# Patient Record
Sex: MALE | Race: WHITE | NOT HISPANIC OR LATINO | Employment: FULL TIME | URBAN - METROPOLITAN AREA
[De-identification: names, ages, dates, MRNs, and addresses within clinical notes are randomized per-mention and may not be internally consistent; named-entity substitution may affect disease eponyms.]

---

## 2023-05-17 ENCOUNTER — OFFICE VISIT (OUTPATIENT)
Dept: FAMILY MEDICINE | Facility: CLINIC | Age: 58
End: 2023-05-17
Payer: COMMERCIAL

## 2023-05-17 VITALS
WEIGHT: 238.5 LBS | HEART RATE: 98 BPM | OXYGEN SATURATION: 94 % | DIASTOLIC BLOOD PRESSURE: 72 MMHG | TEMPERATURE: 98.2 F | BODY MASS INDEX: 37.43 KG/M2 | HEIGHT: 67 IN | RESPIRATION RATE: 20 BRPM | SYSTOLIC BLOOD PRESSURE: 132 MMHG

## 2023-05-17 DIAGNOSIS — Z11.59 NEED FOR HEPATITIS C SCREENING TEST: ICD-10-CM

## 2023-05-17 DIAGNOSIS — Z11.4 SCREENING FOR HIV (HUMAN IMMUNODEFICIENCY VIRUS): ICD-10-CM

## 2023-05-17 DIAGNOSIS — E03.1 CONGENITAL HYPOTHYROIDISM WITHOUT GOITER: ICD-10-CM

## 2023-05-17 DIAGNOSIS — Z12.11 SCREEN FOR COLON CANCER: ICD-10-CM

## 2023-05-17 DIAGNOSIS — E11.43 TYPE 2 DIABETES MELLITUS WITH DIABETIC AUTONOMIC NEUROPATHY, WITHOUT LONG-TERM CURRENT USE OF INSULIN (H): ICD-10-CM

## 2023-05-17 DIAGNOSIS — E66.01 MORBID OBESITY (H): Primary | ICD-10-CM

## 2023-05-17 DIAGNOSIS — F51.02 ADJUSTMENT INSOMNIA: ICD-10-CM

## 2023-05-17 PROBLEM — E78.5 HYPERLIPIDEMIA: Status: ACTIVE | Noted: 2020-02-10

## 2023-05-17 PROBLEM — I47.10 SVT (SUPRAVENTRICULAR TACHYCARDIA) (H): Status: ACTIVE | Noted: 2020-02-10

## 2023-05-17 PROBLEM — E03.9 HYPOTHYROIDISM: Status: ACTIVE | Noted: 2018-04-26

## 2023-05-17 PROBLEM — R23.4 FISSURE IN SKIN OF FOOT: Status: ACTIVE | Noted: 2019-01-16

## 2023-05-17 PROBLEM — G43.909 MIGRAINES: Status: ACTIVE | Noted: 2023-05-17

## 2023-05-17 LAB — HBA1C MFR BLD: 7.9 % (ref 0–5.6)

## 2023-05-17 PROCEDURE — 83036 HEMOGLOBIN GLYCOSYLATED A1C: CPT | Performed by: PHYSICIAN ASSISTANT

## 2023-05-17 PROCEDURE — 84443 ASSAY THYROID STIM HORMONE: CPT | Performed by: PHYSICIAN ASSISTANT

## 2023-05-17 PROCEDURE — 99204 OFFICE O/P NEW MOD 45 MIN: CPT | Performed by: PHYSICIAN ASSISTANT

## 2023-05-17 PROCEDURE — 80061 LIPID PANEL: CPT | Performed by: PHYSICIAN ASSISTANT

## 2023-05-17 PROCEDURE — 36415 COLL VENOUS BLD VENIPUNCTURE: CPT | Performed by: PHYSICIAN ASSISTANT

## 2023-05-17 PROCEDURE — 99207 PR FOOT EXAM NO CHARGE: CPT | Performed by: PHYSICIAN ASSISTANT

## 2023-05-17 RX ORDER — LANCETS 33 GAUGE
EACH MISCELLANEOUS
COMMUNITY
Start: 2023-02-01

## 2023-05-17 RX ORDER — METOPROLOL TARTRATE 50 MG
50 TABLET ORAL DAILY
COMMUNITY
Start: 2023-01-20 | End: 2024-05-29

## 2023-05-17 RX ORDER — TRAZODONE HYDROCHLORIDE 50 MG/1
50 TABLET, FILM COATED ORAL AT BEDTIME
Qty: 30 TABLET | Refills: 0 | Status: SHIPPED | OUTPATIENT
Start: 2023-05-17 | End: 2023-06-12

## 2023-05-17 RX ORDER — LEVOTHYROXINE SODIUM 200 UG/1
1 TABLET ORAL DAILY
COMMUNITY
Start: 2023-01-23 | End: 2024-05-08

## 2023-05-17 RX ORDER — SUMATRIPTAN 50 MG/1
TABLET, FILM COATED ORAL
COMMUNITY
Start: 2023-01-20 | End: 2024-05-08

## 2023-05-17 RX ORDER — BLOOD SUGAR DIAGNOSTIC
1 STRIP MISCELLANEOUS 3 TIMES DAILY
COMMUNITY
Start: 2023-04-30

## 2023-05-17 RX ORDER — METFORMIN HCL 500 MG
2000 TABLET, EXTENDED RELEASE 24 HR ORAL
COMMUNITY
Start: 2023-01-20 | End: 2024-07-09

## 2023-05-17 RX ORDER — GLIPIZIDE 10 MG/1
10 TABLET, FILM COATED, EXTENDED RELEASE ORAL 2 TIMES DAILY
COMMUNITY
Start: 2023-01-20

## 2023-05-17 RX ORDER — ATORVASTATIN CALCIUM 10 MG/1
10 TABLET, FILM COATED ORAL DAILY
COMMUNITY
Start: 2023-01-20 | End: 2024-05-08

## 2023-05-17 ASSESSMENT — ENCOUNTER SYMPTOMS
NERVOUS/ANXIOUS: 0
SLEEP DISTURBANCE: 1
ACTIVITY CHANGE: 1
GASTROINTESTINAL NEGATIVE: 1
CARDIOVASCULAR NEGATIVE: 1
RESPIRATORY NEGATIVE: 1
NUMBNESS: 1

## 2023-05-17 NOTE — PROGRESS NOTES
"  Assessment & Plan     Congenital hypothyroidism without goiter  Labs today  - TSH WITH FREE T4 REFLEX  - TSH WITH FREE T4 REFLEX    Screen for colon cancer  Good for another 70    Screening for HIV (human immunodeficiency virus)  Has had screening in the past defers today    Need for hepatitis C screening test  Defers today  - Hemoglobin A1c  - Hemoglobin A1c    Type 2 diabetes mellitus with diabetic autonomic neuropathy, without long-term current use of insulin (H)  Labs pending  - Lipid panel reflex to direct LDL Non-fasting  - empagliflozin (JARDIANCE) 10 MG TABS tablet  Dispense: 90 tablet; Refill: 1  - Hemoglobin A1c  - FOOT EXAM  - Lipid panel reflex to direct LDL Non-fasting  - Hemoglobin A1c    Morbid obesity (H)  Is less active at work will try to be more active outside of work  - Hemoglobin A1c  - Hemoglobin A1c    Adjustment insomnia  Trial of trazodone for sleep side effects discussed  - traZODone (DESYREL) 50 MG tablet  Dispense: 30 tablet; Refill: 0               BMI:   Estimated body mass index is 37.35 kg/m  as calculated from the following:    Height as of this encounter: 1.702 m (5' 7\").    Weight as of this encounter: 108.2 kg (238 lb 8 oz).           KERLINE Land  [facility] [place]    Yan Clarke is a 57 year old, presenting for the following health issues:  Establish Care    57-year-old here to establish care he has history of hypo-thyroidism hypertension type 2 diabetes  He has some difficulty with insomnia as of late more difficulty maintaining sleep rare difficulty initiating sleep  No new social change although is been going through the divorce process for the last couple of years  He was prescribed Jardiance but had some difficulty obtaining that prescription due to cost locally he has found out that he can get it through [facility] very economically and would like a prescription sent for that he is due for some screening lab work  Colonoscopy 3 " "years ago which was normal he was scheduled for follow-up in 10 years    History of Present Illness       Reason for visit:  Changing clinic after a move and updating meds    He eats 0-1 servings of fruits and vegetables daily.He consumes 0 sweetened beverage(s) daily.He exercises with enough effort to increase his heart rate 9 or less minutes per day.  He exercises with enough effort to increase his heart rate 5 days per week. He is missing 1 dose(s) of medications per week.  He is not taking prescribed medications regularly due to remembering to take.               Review of Systems   Constitutional: Positive for activity change.   Respiratory: Negative.    Cardiovascular: Negative.    Gastrointestinal: Negative.    Neurological: Positive for numbness.   Psychiatric/Behavioral: Positive for sleep disturbance. The patient is not nervous/anxious.             Objective    /72   Pulse 98   Temp 98.2  F (36.8  C)   Resp 20   Ht 1.702 m (5' 7\")   Wt 108.2 kg (238 lb 8 oz)   SpO2 94%   BMI 37.35 kg/m    Body mass index is 37.35 kg/m .  Physical Exam  Vitals and nursing note reviewed.   Constitutional:       Appearance: Normal appearance.   HENT:      Head: Normocephalic and atraumatic.      Right Ear: Tympanic membrane normal.      Left Ear: Tympanic membrane normal.      Nose: Nose normal. No congestion or rhinorrhea.      Mouth/Throat:      Mouth: Mucous membranes are moist.   Eyes:      Conjunctiva/sclera: Conjunctivae normal.   Cardiovascular:      Rate and Rhythm: Normal rate and regular rhythm.      Heart sounds: Normal heart sounds.   Pulmonary:      Effort: Pulmonary effort is normal.      Breath sounds: Normal breath sounds.   Abdominal:      General: Abdomen is flat. Bowel sounds are normal.      Palpations: There is no mass.      Tenderness: There is no abdominal tenderness.   Musculoskeletal:         General: Normal range of motion.      Cervical back: Normal range of motion and neck supple.      " Right lower leg: No edema.      Left lower leg: No edema.   Lymphadenopathy:      Cervical: No cervical adenopathy.   Skin:     General: Skin is warm and dry.      Comments: Onychomycotic changes right great toenail   Neurological:      General: No focal deficit present.      Comments: Monofilament 5/5 to the feet bilaterally   Psychiatric:         Mood and Affect: Mood normal.         Behavior: Behavior normal.         Thought Content: Thought content normal.         Judgment: Judgment normal.

## 2023-05-17 NOTE — LETTER
May 19, 2023      Vince Martinez  [address] [address]        Dear ,    We are writing to inform you of your test results.    Your diabetes is under acceptable control but we should be able to improve that with the addition of Jardiance.  Your TSH is normal.  Your lipid panel looks acceptable except for the elevated triglycerides.  Because this was a nonfasting level I am not overly concerned about that.  Next year we should do those fasting.    Resulted Orders   TSH WITH FREE T4 REFLEX   Result Value Ref Range    TSH 0.46 0.30 - 4.20 uIU/mL   Lipid panel reflex to direct LDL Non-fasting   Result Value Ref Range    Cholesterol 161 <200 mg/dL    Triglycerides 322 (H) <150 mg/dL    Direct Measure HDL 50 >=40 mg/dL    LDL Cholesterol Calculated 47 <=100 mg/dL    Non HDL Cholesterol 111 <130 mg/dL    Narrative    Cholesterol  Desirable:  <200 mg/dL    Triglycerides  Normal:  Less than 150 mg/dL  Borderline High:  150-199 mg/dL  High:  200-499 mg/dL  Very High:  Greater than or equal to 500 mg/dL    Direct Measure HDL  Female:  Greater than or equal to 50 mg/dL   Male:  Greater than or equal to 40 mg/dL    LDL Cholesterol  Desirable:  <100mg/dL  Above Desirable:  100-129 mg/dL   Borderline High:  130-159 mg/dL   High:  160-189 mg/dL   Very High:  >= 190 mg/dL    Non HDL Cholesterol  Desirable:  130 mg/dL  Above Desirable:  130-159 mg/dL  Borderline High:  160-189 mg/dL  High:  190-219 mg/dL  Very High:  Greater than or equal to 220 mg/dL   Hemoglobin A1c   Result Value Ref Range    Hemoglobin A1C 7.9 (H) 0.0 - 5.6 %      Comment:      Normal <5.7%   Prediabetes 5.7-6.4%    Diabetes 6.5% or higher     Note: Adopted from ADA consensus guidelines.       If you have any questions or concerns, please call the clinic at the number listed above.       Sincerely,      KERLINE Falcon

## 2023-05-18 LAB
CHOLEST SERPL-MCNC: 161 MG/DL
HDLC SERPL-MCNC: 50 MG/DL
LDLC SERPL CALC-MCNC: 47 MG/DL
NONHDLC SERPL-MCNC: 111 MG/DL
TRIGL SERPL-MCNC: 322 MG/DL
TSH SERPL DL<=0.005 MIU/L-ACNC: 0.46 UIU/ML (ref 0.3–4.2)

## 2023-06-09 DIAGNOSIS — F51.02 ADJUSTMENT INSOMNIA: ICD-10-CM

## 2023-06-12 RX ORDER — TRAZODONE HYDROCHLORIDE 50 MG/1
TABLET, FILM COATED ORAL
Qty: 30 TABLET | Refills: 0 | Status: SHIPPED | OUTPATIENT
Start: 2023-06-12 | End: 2023-07-10

## 2023-06-12 NOTE — TELEPHONE ENCOUNTER
"Last Written Prescription Date: 5/17/23  Last Fill Quantity: 30,  # refills: 0  Last office visit provider:  Jayant Dalton 5/17/23  Adjustment insomnia  Trial of trazodone for sleep side effects discussed  - traZODone (DESYREL) 50 MG tablet  Dispense: 30 tablet; Refill: 0    Will only approve for 30 days.  If patient wants more refills, stated in Pharmacy note, that patient will need an office visit.    Writer attempted to call patient with no response, inability to leave VM.     Requested Prescriptions   Pending Prescriptions Disp Refills     traZODone (DESYREL) 50 MG tablet [Pharmacy Med Name: TRAZODONE 50 MG TABLET] 30 tablet 0     Sig: TAKE 1 TABLET BY MOUTH AT BEDTIME       Serotonin Modulators Passed - 6/9/2023 11:36 AM        Passed - Recent (12 mo) or future (30 days) visit within the authorizing provider's specialty     Patient has had an office visit with the authorizing provider or a provider within the authorizing providers department within the previous 12 mos or has a future within next 30 days. See \"Patient Info\" tab in inbasket, or \"Choose Columns\" in Meds & Orders section of the refill encounter.              Passed - Medication is active on med list        Passed - Patient is age 18 or older             Rafael Haider RN 06/12/23 11:35 AM  "

## 2023-07-09 DIAGNOSIS — F51.02 ADJUSTMENT INSOMNIA: ICD-10-CM

## 2023-07-09 NOTE — TELEPHONE ENCOUNTER
"Routing refill request to provider for review/approval because:  Per sig: If patient wants to continue this medication he needs an office visit with provider.    Last Written Prescription Date:  6/12/23  Last Fill Quantity: 30 ,  # refills: 0  Last office visit provider:  5/17/23 with rere    Requested Prescriptions   Pending Prescriptions Disp Refills     traZODone (DESYREL) 50 MG tablet [Pharmacy Med Name: TRAZODONE 50 MG TABLET] 30 tablet 0     Sig: TAKE 1 TABLET BY MOUTH EVERYDAY AT BEDTIME       Serotonin Modulators Passed - 7/9/2023  4:34 PM        Passed - Recent (12 mo) or future (30 days) visit within the authorizing provider's specialty     Patient has had an office visit with the authorizing provider or a provider within the authorizing providers department within the previous 12 mos or has a future within next 30 days. See \"Patient Info\" tab in inbasket, or \"Choose Columns\" in Meds & Orders section of the refill encounter.              Passed - Medication is active on med list        Passed - Patient is age 18 or older             PRIYA LEAVITT RN 07/09/23 4:37 PM  "

## 2023-07-10 RX ORDER — TRAZODONE HYDROCHLORIDE 50 MG/1
TABLET, FILM COATED ORAL
Qty: 30 TABLET | Refills: 0 | Status: SHIPPED | OUTPATIENT
Start: 2023-07-10 | End: 2023-08-07

## 2023-08-05 DIAGNOSIS — F51.02 ADJUSTMENT INSOMNIA: ICD-10-CM

## 2023-08-06 NOTE — TELEPHONE ENCOUNTER
"  Last Written Prescription Date:  7/10/23  Last Fill Quantity: 30,  # refills: 0   Last office visit provider:   5/17/23    Requested Prescriptions   Pending Prescriptions Disp Refills    traZODone (DESYREL) 50 MG tablet [Pharmacy Med Name: TRAZODONE 50 MG TABLET] 30 tablet 0     Sig: TAKE 1 TABLET BY MOUTH EVERYDAY AT BEDTIME       Serotonin Modulators Passed - 8/5/2023 12:31 PM        Passed - Recent (12 mo) or future (30 days) visit within the authorizing provider's specialty     Patient has had an office visit with the authorizing provider or a provider within the authorizing providers department within the previous 12 mos or has a future within next 30 days. See \"Patient Info\" tab in inbasket, or \"Choose Columns\" in Meds & Orders section of the refill encounter.              Passed - Medication is active on med list        Passed - Patient is age 18 or older             Krissy Kitchen RN 08/06/23 3:01 PM  "

## 2023-08-07 RX ORDER — TRAZODONE HYDROCHLORIDE 50 MG/1
TABLET, FILM COATED ORAL
Qty: 30 TABLET | Refills: 0 | Status: SHIPPED | OUTPATIENT
Start: 2023-08-07 | End: 2023-09-02

## 2023-09-02 DIAGNOSIS — F51.02 ADJUSTMENT INSOMNIA: ICD-10-CM

## 2023-09-02 RX ORDER — TRAZODONE HYDROCHLORIDE 50 MG/1
TABLET, FILM COATED ORAL
Qty: 90 TABLET | Refills: 2 | Status: SHIPPED | OUTPATIENT
Start: 2023-09-02 | End: 2024-07-08

## 2023-09-02 NOTE — TELEPHONE ENCOUNTER
"  Last Written Prescription Date:  8/7/23  Last Fill Quantity: 30,  # refills: 0   Last office visit provider:   5/17/23    Requested Prescriptions   Pending Prescriptions Disp Refills    traZODone (DESYREL) 50 MG tablet [Pharmacy Med Name: TRAZODONE 50 MG TABLET] 30 tablet 0     Sig: TAKE 1 TABLET BY MOUTH EVERYDAY AT BEDTIME       Serotonin Modulators Passed - 9/2/2023  9:35 AM        Passed - Recent (12 mo) or future (30 days) visit within the authorizing provider's specialty     Patient has had an office visit with the authorizing provider or a provider within the authorizing providers department within the previous 12 mos or has a future within next 30 days. See \"Patient Info\" tab in inbasket, or \"Choose Columns\" in Meds & Orders section of the refill encounter.              Passed - Medication is active on med list        Passed - Patient is age 18 or older             Krissy Kitchen RN 09/02/23 5:58 PM  "

## 2024-05-08 ENCOUNTER — OFFICE VISIT (OUTPATIENT)
Dept: FAMILY MEDICINE | Facility: CLINIC | Age: 59
End: 2024-05-08
Payer: COMMERCIAL

## 2024-05-08 VITALS
OXYGEN SATURATION: 97 % | RESPIRATION RATE: 18 BRPM | TEMPERATURE: 97.1 F | BODY MASS INDEX: 36.37 KG/M2 | WEIGHT: 231.7 LBS | SYSTOLIC BLOOD PRESSURE: 128 MMHG | HEART RATE: 64 BPM | HEIGHT: 67 IN | DIASTOLIC BLOOD PRESSURE: 86 MMHG

## 2024-05-08 DIAGNOSIS — Z11.4 SCREENING FOR HIV (HUMAN IMMUNODEFICIENCY VIRUS): ICD-10-CM

## 2024-05-08 DIAGNOSIS — Z00.00 ANNUAL PHYSICAL EXAM: ICD-10-CM

## 2024-05-08 DIAGNOSIS — G43.009 MIGRAINE WITHOUT AURA AND WITHOUT STATUS MIGRAINOSUS, NOT INTRACTABLE: ICD-10-CM

## 2024-05-08 DIAGNOSIS — E11.43 TYPE 2 DIABETES MELLITUS WITH DIABETIC AUTONOMIC NEUROPATHY, WITHOUT LONG-TERM CURRENT USE OF INSULIN (H): Primary | ICD-10-CM

## 2024-05-08 DIAGNOSIS — Z11.59 NEED FOR HEPATITIS C SCREENING TEST: ICD-10-CM

## 2024-05-08 DIAGNOSIS — E66.01 MORBID OBESITY (H): ICD-10-CM

## 2024-05-08 DIAGNOSIS — E03.1 CONGENITAL HYPOTHYROIDISM WITHOUT GOITER: ICD-10-CM

## 2024-05-08 DIAGNOSIS — E78.2 MIXED HYPERLIPIDEMIA: ICD-10-CM

## 2024-05-08 DIAGNOSIS — F51.02 ADJUSTMENT INSOMNIA: ICD-10-CM

## 2024-05-08 PROBLEM — I47.10 SVT (SUPRAVENTRICULAR TACHYCARDIA) (H): Status: RESOLVED | Noted: 2020-02-10 | Resolved: 2024-05-08

## 2024-05-08 LAB — HBA1C MFR BLD: 8 % (ref 0–5.6)

## 2024-05-08 PROCEDURE — 82043 UR ALBUMIN QUANTITATIVE: CPT | Performed by: PHYSICIAN ASSISTANT

## 2024-05-08 PROCEDURE — 99214 OFFICE O/P EST MOD 30 MIN: CPT | Mod: 25 | Performed by: PHYSICIAN ASSISTANT

## 2024-05-08 PROCEDURE — 87389 HIV-1 AG W/HIV-1&-2 AB AG IA: CPT | Performed by: PHYSICIAN ASSISTANT

## 2024-05-08 PROCEDURE — 36415 COLL VENOUS BLD VENIPUNCTURE: CPT | Performed by: PHYSICIAN ASSISTANT

## 2024-05-08 PROCEDURE — 90715 TDAP VACCINE 7 YRS/> IM: CPT | Performed by: PHYSICIAN ASSISTANT

## 2024-05-08 PROCEDURE — 83036 HEMOGLOBIN GLYCOSYLATED A1C: CPT | Performed by: PHYSICIAN ASSISTANT

## 2024-05-08 PROCEDURE — 82570 ASSAY OF URINE CREATININE: CPT | Performed by: PHYSICIAN ASSISTANT

## 2024-05-08 PROCEDURE — 83721 ASSAY OF BLOOD LIPOPROTEIN: CPT | Mod: 59 | Performed by: PHYSICIAN ASSISTANT

## 2024-05-08 PROCEDURE — 99396 PREV VISIT EST AGE 40-64: CPT | Mod: 25 | Performed by: PHYSICIAN ASSISTANT

## 2024-05-08 PROCEDURE — 84443 ASSAY THYROID STIM HORMONE: CPT | Performed by: PHYSICIAN ASSISTANT

## 2024-05-08 PROCEDURE — 84439 ASSAY OF FREE THYROXINE: CPT | Performed by: PHYSICIAN ASSISTANT

## 2024-05-08 PROCEDURE — 90471 IMMUNIZATION ADMIN: CPT | Performed by: PHYSICIAN ASSISTANT

## 2024-05-08 PROCEDURE — 80048 BASIC METABOLIC PNL TOTAL CA: CPT | Performed by: PHYSICIAN ASSISTANT

## 2024-05-08 PROCEDURE — 86803 HEPATITIS C AB TEST: CPT | Performed by: PHYSICIAN ASSISTANT

## 2024-05-08 PROCEDURE — 80061 LIPID PANEL: CPT | Performed by: PHYSICIAN ASSISTANT

## 2024-05-08 RX ORDER — SUMATRIPTAN 50 MG/1
50 TABLET, FILM COATED ORAL
Qty: 9 TABLET | Refills: 11 | Status: SHIPPED | OUTPATIENT
Start: 2024-05-08

## 2024-05-08 RX ORDER — ATORVASTATIN CALCIUM 10 MG/1
10 TABLET, FILM COATED ORAL DAILY
Qty: 90 TABLET | Refills: 3 | Status: SHIPPED | OUTPATIENT
Start: 2024-05-08

## 2024-05-08 RX ORDER — LEVOTHYROXINE SODIUM 200 UG/1
200 TABLET ORAL DAILY
Qty: 90 TABLET | Refills: 3 | Status: SHIPPED | OUTPATIENT
Start: 2024-05-08

## 2024-05-08 SDOH — HEALTH STABILITY: PHYSICAL HEALTH: ON AVERAGE, HOW MANY DAYS PER WEEK DO YOU ENGAGE IN MODERATE TO STRENUOUS EXERCISE (LIKE A BRISK WALK)?: 0 DAYS

## 2024-05-08 ASSESSMENT — SOCIAL DETERMINANTS OF HEALTH (SDOH): HOW OFTEN DO YOU GET TOGETHER WITH FRIENDS OR RELATIVES?: ONCE A WEEK

## 2024-05-08 NOTE — LETTER
May 10, 2024      Vince Martinez  [address] [address]        Dear ,    We are writing to inform you of your test results.    Here are your lab results.  Your lipids are more elevated than I would like, and I would consider either increasing your current dose of statin, or switching to a more potent statin.  Your hemoglobin A1c is more elevated than I would like.  I would like you to consider increasing the Jardiance to 25 mg once daily.  Your TSH and free T4 are abnormal indicating that you have been off your medication for a while.  We should recheck those in 3 months after you are back on the medication to ensure it is an adequate dose.  Please call me next week to discuss these issues.    Resulted Orders   BASIC METABOLIC PANEL   Result Value Ref Range    Sodium 138 135 - 145 mmol/L      Comment:      Reference intervals for this test were updated on 09/26/2023 to more accurately reflect our healthy population. There may be differences in the flagging of prior results with similar values performed with this method. Interpretation of those prior results can be made in the context of the updated reference intervals.     Potassium 4.8 3.4 - 5.3 mmol/L    Chloride 102 98 - 107 mmol/L    Carbon Dioxide (CO2) 26 22 - 29 mmol/L    Anion Gap 10 7 - 15 mmol/L    Urea Nitrogen 18.3 8.0 - 23.0 mg/dL    Creatinine 1.16 0.67 - 1.17 mg/dL    GFR Estimate 73 >60 mL/min/1.73m2    Calcium 9.5 8.6 - 10.0 mg/dL    Glucose 125 (H) 70 - 99 mg/dL    Patient Fasting > 8hrs? Unknown    HIV Antigen Antibody Combo   Result Value Ref Range    HIV Antigen Antibody Combo Nonreactive Nonreactive      Comment:      Negative HIV-1 p24 antigen and HIV-1/2 antibody screening test results usually indicate the absence of HIV-1 and HIV-2 infection. However, such negative results do not rule-out acute HIV infection.  If acute HIV-1 or HIV-2 infection is suspected, detection of HIV-1 or HIV-2 RNA  is  recommended.    Hepatitis C Screen Reflex to HCV RNA Quant and Genotype   Result Value Ref Range    Hepatitis C Antibody Nonreactive Nonreactive      Comment:      A nonreactive screening test result does not exclude the possibility of exposure to or infection with HCV. Nonreactive screening test results in individuals with prior exposure to HCV may be due to antibody levels below the limit of detection of this assay or lack of reactivity to the HCV antigens used in this assay. Patients with recent HCV infections (<3 months from time of exposure) may have false-negative HCV antibody results due to the time needed for seroconversion (average of 8 to 9 weeks).   HEMOGLOBIN A1C   Result Value Ref Range    Hemoglobin A1C 8.0 (H) 0.0 - 5.6 %      Comment:      Normal <5.7%   Prediabetes 5.7-6.4%    Diabetes 6.5% or higher     Note: Adopted from ADA consensus guidelines.   Lipid panel reflex to direct LDL Non-fasting   Result Value Ref Range    Cholesterol 312 (H) <200 mg/dL    Triglycerides 478 (H) <150 mg/dL    Direct Measure HDL 55 >=40 mg/dL    LDL Cholesterol Calculated        Comment:      Cannot estimate LDL when triglyceride exceeds 400 mg/dL    Non HDL Cholesterol 257 (H) <130 mg/dL    Patient Fasting > 8hrs? Unknown     Narrative    Cholesterol  Desirable:  <200 mg/dL    Triglycerides  Normal:  Less than 150 mg/dL  Borderline High:  150-199 mg/dL  High:  200-499 mg/dL  Very High:  Greater than or equal to 500 mg/dL    Direct Measure HDL  Female:  Greater than or equal to 50 mg/dL   Male:  Greater than or equal to 40 mg/dL    LDL Cholesterol  Desirable:  <100mg/dL  Above Desirable:  100-129 mg/dL   Borderline High:  130-159 mg/dL   High:  160-189 mg/dL   Very High:  >= 190 mg/dL    Non HDL Cholesterol  Desirable:  130 mg/dL  Above Desirable:  130-159 mg/dL  Borderline High:  160-189 mg/dL  High:  190-219 mg/dL  Very High:  Greater than or equal to 220 mg/dL   TSH WITH FREE T4 REFLEX   Result Value Ref Range     TSH 57.00 (H) 0.30 - 4.20 uIU/mL   Albumin Random Urine Quantitative with Creat Ratio   Result Value Ref Range    Creatinine Urine mg/dL 80.4 mg/dL      Comment:      The reference ranges have not been established in urine creatinine. The results should be integrated into the clinical context for interpretation.    Albumin Urine mg/L <12.0 mg/L      Comment:      The reference ranges have not been established in urine albumin. The results should be integrated into the clinical context for interpretation.    Albumin Urine mg/g Cr        Comment:      Unable to calculate, urine albumin and/or urine creatinine is outside detectable limits.  Microalbuminuria is defined as an albumin:creatinine ratio of 17 to 299 for males and 25 to 299 for females. A ratio of albumin:creatinine of 300 or higher is indicative of overt proteinuria.  Due to biologic variability, positive results should be confirmed by a second, first-morning random or 24-hour timed urine specimen. If there is discrepancy, a third specimen is recommended. When 2 out of 3 results are in the microalbuminuria range, this is evidence for incipient nephropathy and warrants increased efforts at glucose control, blood pressure control, and institution of therapy with an angiotensin-converting-enzyme (ACE) inhibitor (if the patient can tolerate it).     T4 free   Result Value Ref Range    Free T4 0.55 (L) 0.90 - 1.70 ng/dL   LDL cholesterol direct   Result Value Ref Range    LDL Cholesterol Direct 194 (H) <100 mg/dL      Comment:      Age 2-19 years:  Desirable: 0-110 mg/dL   Borderline high: 110-129 mg/dL   High: >= 130 mg/dL    Age 20 years and older:  Desirable: <100mg/dL  Above desirable: 100-129 mg/dL   Borderline high: 130-159 mg/dL   High: 160-189 mg/dL   Very high: >= 190 mg/dL       If you have any questions or concerns, please call the clinic at the number listed above.       Sincerely,      KERLINE Falcon

## 2024-05-08 NOTE — PROGRESS NOTES
Preventive Care Visit  [facility]  KERLINE Land, Family Medicine  May 8, 2024      Assessment & Plan     (E11.43) Type 2 diabetes mellitus with diabetic autonomic neuropathy, without long-term current use of insulin (H)  (primary encounter diagnosis)  Comment: Stable  Plan: BASIC METABOLIC PANEL, HEMOGLOBIN A1C, Lipid         panel reflex to direct LDL Non-fasting, Albumin        Random Urine Quantitative with Creat Ratio        Labs pending    (Z11.4) Screening for HIV (human immunodeficiency virus)  Comment: Consents to screening  Plan: HIV Antigen Antibody Combo, TSH WITH FREE T4         REFLEX        Lab today    (Z11.59) Need for hepatitis C screening test  Comment: Consents to screening  Plan: Hepatitis C Screen Reflex to HCV RNA Quant and         Genotype, TSH WITH FREE T4 REFLEX, Albumin         Random Urine Quantitative with Creat Ratio        Labs today    (F51.02) Adjustment insomnia  Comment: Will get back on his meds and see if it makes a difference if not he will follow-up  Plan:     (E03.1) Congenital hypothyroidism without goiter  Comment: Labs pending  Plan: TSH WITH FREE T4 REFLEX, levothyroxine         (SYNTHROID/LEVOTHROID) 200 MCG tablet        Continue current medication    (G43.009) Migraine without aura and without status migrainosus, not intractable  Comment: Stable infrequent  Plan: TSH WITH FREE T4 REFLEX, SUMAtriptan (IMITREX)         50 MG tablet        Labs pending    (Z00.00) Annual physical exam  Comment: Stable  Plan: TSH WITH FREE T4 REFLEX        Return in 1 year and as needed    (E78.2) Mixed hyperlipidemia  Comment: Stable  Plan: atorvastatin (LIPITOR) 10 MG tablet        Labs pending continue current medication    (E66.01) Morbid obesity (H)  Comment: Work on lifestyle modification  Plan: Work on lifestyle modification              BMI  Estimated body mass index is 36.29 kg/m  as calculated from the following:    Height as of this encounter:  "1.702 m (5' 7\").    Weight as of this encounter: 105.1 kg (231 lb 11.2 oz).       Counseling  Appropriate preventive services were discussed with this patient, including applicable screening as appropriate for fall prevention, nutrition, physical activity, Tobacco-use cessation, weight loss and cognition.  Checklist reviewing preventive services available has been given to the patient.  Reviewed patient's diet, addressing concerns and/or questions.   The patient was instructed to see the dentist every 6 months.   He is at risk for psychosocial distress and has been provided with information to reduce risk.           Yan Clarke is a 58 year old, presenting for the following:  Physical (Refill medications )        5/8/2024     2:59 PM   Additional Questions   Roomed by BRYAN Low        Health Care Directive  Patient does not have a Health Care Directive or Living Will:     58-year-old here for preventive visit  Chronic disease visit as well  Tolerates his medications without difficulty  Rarely gets SVT he does some Valsalva maneuvers to get it to resolve  Over the last couple weeks has had some difficulty sleeping problems initiating sleep problems staying asleep he will wake up in 2 to 4 hours and does not feel tired difficult time getting back to sleep  He has been using some CBN ?CBD oil for the last couple of months wanted my opinion told him I did not have enough experience to give a educated opinion  No chest pain or shortness of breath  In his left foot he occasionally will experience a sensation of numbness and then some burning cramping type pain he mainly notices it at work                  5/8/2024   General Health   How would you rate your overall physical health? (!) FAIR   Feel stress (tense, anxious, or unable to sleep) Only a little   (!) STRESS CONCERN      5/8/2024   Nutrition   Three or more servings of calcium each day? (!) NO   Diet: Diabetic   How many servings of fruit and vegetables " "per day? (!) 0-1   How many sweetened beverages each day? 0-1         5/8/2024   Exercise   Days per week of moderate/strenous exercise 0 days   (!) EXERCISE CONCERN      5/8/2024   Social Factors   Frequency of gathering with friends or relatives Once a week   Worry food won't last until get money to buy more No   Food not last or not have enough money for food? No   Do you have housing?  Yes   Are you worried about losing your housing? No   Lack of transportation? No   Unable to get utilities (heat,electricity)? No         5/8/2024   Fall Risk   Fallen 2 or more times in the past year? No   Trouble with walking or balance? No          5/8/2024   Dental   Dentist two times every year? (!) NO         5/8/2024   TB Screening   Were you born outside of the [place]? No         Today's PHQ-2 Score:       5/8/2024     2:55 PM   PHQ-2 ( 1999 [employer])   Q1: Little interest or pleasure in doing things 1   Q2: Feeling down, depressed or hopeless 0   PHQ-2 Score 1   Q1: Little interest or pleasure in doing things Several days   Q2: Feeling down, depressed or hopeless Not at all   PHQ-2 Score 1           5/8/2024   Substance Use   Alcohol more than 3/day or more than 7/wk No   Do you use any other substances recreationally? (!) CANNABIS PRODUCTS     Social History     Tobacco Use    Smoking status: Never     Passive exposure: Never    Smokeless tobacco: Never   Vaping Use    Vaping status: Never Used             5/8/2024   One time HIV Screening   Previous HIV test? Yes         5/8/2024   STI Screening   New sexual partner(s) since last STI/HIV test? No   Last PSA: No results found for: \"PSA\"  ASCVD Risk   The 10-year ASCVD risk score (Kamala IGLESIAS, et al., 2019) is: 12.1%    Values used to calculate the score:      Age: 58 years      Sex: Male      Is Non- : No      Diabetic: Yes      Tobacco smoker: No      Systolic Blood Pressure: 128 mmHg      Is BP treated: No      HDL Cholesterol: 50 mg/dL      " "Total Cholesterol: 161 mg/dL           Reviewed and updated as needed this visit by Provider                             Objective    Exam  /86 (BP Location: Right arm, Patient Position: Sitting, Cuff Size: Adult Large)   Pulse 64   Temp 97.1  F (36.2  C) (Tympanic)   Resp 18   Ht 1.702 m (5' 7\")   Wt 105.1 kg (231 lb 11.2 oz)   SpO2 97%   BMI 36.29 kg/m     Estimated body mass index is 36.29 kg/m  as calculated from the following:    Height as of this encounter: 1.702 m (5' 7\").    Weight as of this encounter: 105.1 kg (231 lb 11.2 oz).    Physical Exam  Vitals and nursing note reviewed.   Constitutional:       Appearance: Normal appearance.   HENT:      Head: Normocephalic and atraumatic.      Right Ear: Tympanic membrane normal.      Left Ear: Tympanic membrane normal.      Nose: Nose normal. No congestion or rhinorrhea.      Mouth/Throat:      Mouth: Mucous membranes are moist.   Eyes:      Conjunctiva/sclera: Conjunctivae normal.   Cardiovascular:      Rate and Rhythm: Normal rate and regular rhythm.      Heart sounds: Normal heart sounds.      Comments: Strong dorsalis pedal pulses bilaterally  Pulmonary:      Effort: Pulmonary effort is normal.      Breath sounds: Normal breath sounds.   Abdominal:      General: Abdomen is flat. Bowel sounds are normal.      Palpations: There is no mass.      Tenderness: There is no abdominal tenderness.   Musculoskeletal:         General: Normal range of motion.      Cervical back: Normal range of motion and neck supple.      Right lower leg: No edema.      Left lower leg: No edema.   Lymphadenopathy:      Cervical: No cervical adenopathy.   Skin:     General: Skin is warm and dry.   Neurological:      General: No focal deficit present.      Comments: Monofilament 5/5 to the feet bilateral   Psychiatric:         Mood and Affect: Mood normal.         Behavior: Behavior normal.         Thought Content: Thought content normal.         Judgment: Judgment normal. "               Signed Electronically by: KERLINE Land

## 2024-05-09 LAB
ANION GAP SERPL CALCULATED.3IONS-SCNC: 10 MMOL/L (ref 7–15)
BUN SERPL-MCNC: 18.3 MG/DL (ref 8–23)
CALCIUM SERPL-MCNC: 9.5 MG/DL (ref 8.6–10)
CHLORIDE SERPL-SCNC: 102 MMOL/L (ref 98–107)
CHOLEST SERPL-MCNC: 312 MG/DL
CREAT SERPL-MCNC: 1.16 MG/DL (ref 0.67–1.17)
CREAT UR-MCNC: 80.4 MG/DL
DEPRECATED HCO3 PLAS-SCNC: 26 MMOL/L (ref 22–29)
EGFRCR SERPLBLD CKD-EPI 2021: 73 ML/MIN/1.73M2
FASTING STATUS PATIENT QL REPORTED: ABNORMAL
FASTING STATUS PATIENT QL REPORTED: ABNORMAL
GLUCOSE SERPL-MCNC: 125 MG/DL (ref 70–99)
HCV AB SERPL QL IA: NONREACTIVE
HDLC SERPL-MCNC: 55 MG/DL
HIV 1+2 AB+HIV1 P24 AG SERPL QL IA: NONREACTIVE
LDLC SERPL CALC-MCNC: ABNORMAL MG/DL
LDLC SERPL DIRECT ASSAY-MCNC: 194 MG/DL
MICROALBUMIN UR-MCNC: <12 MG/L
MICROALBUMIN/CREAT UR: NORMAL MG/G{CREAT}
NONHDLC SERPL-MCNC: 257 MG/DL
POTASSIUM SERPL-SCNC: 4.8 MMOL/L (ref 3.4–5.3)
SODIUM SERPL-SCNC: 138 MMOL/L (ref 135–145)
T4 FREE SERPL-MCNC: 0.55 NG/DL (ref 0.9–1.7)
TRIGL SERPL-MCNC: 478 MG/DL
TSH SERPL DL<=0.005 MIU/L-ACNC: 57 UIU/ML (ref 0.3–4.2)

## 2024-05-29 DIAGNOSIS — G43.009 MIGRAINE WITHOUT AURA AND WITHOUT STATUS MIGRAINOSUS, NOT INTRACTABLE: Primary | ICD-10-CM

## 2024-05-30 RX ORDER — METOPROLOL TARTRATE 50 MG
50 TABLET ORAL DAILY
Qty: 90 TABLET | Refills: 3 | Status: SHIPPED | OUTPATIENT
Start: 2024-05-30

## 2024-06-03 ENCOUNTER — DOCUMENTATION ONLY (OUTPATIENT)
Dept: FAMILY MEDICINE | Facility: CLINIC | Age: 59
End: 2024-06-03
Payer: COMMERCIAL

## 2024-07-08 DIAGNOSIS — F51.02 ADJUSTMENT INSOMNIA: ICD-10-CM

## 2024-07-08 DIAGNOSIS — E11.43 TYPE 2 DIABETES MELLITUS WITH DIABETIC AUTONOMIC NEUROPATHY, WITHOUT LONG-TERM CURRENT USE OF INSULIN (H): ICD-10-CM

## 2024-07-08 RX ORDER — TRAZODONE HYDROCHLORIDE 50 MG/1
TABLET, FILM COATED ORAL
Qty: 90 TABLET | Refills: 1 | Status: SHIPPED | OUTPATIENT
Start: 2024-07-08

## 2024-07-08 RX ORDER — EMPAGLIFLOZIN 10 MG/1
10 TABLET, FILM COATED ORAL DAILY
Qty: 90 TABLET | Refills: 1 | Status: SHIPPED | OUTPATIENT
Start: 2024-07-08

## 2024-07-09 DIAGNOSIS — E11.43 TYPE 2 DIABETES MELLITUS WITH DIABETIC AUTONOMIC NEUROPATHY, WITHOUT LONG-TERM CURRENT USE OF INSULIN (H): Primary | ICD-10-CM

## 2024-07-09 RX ORDER — METFORMIN HCL 500 MG
2000 TABLET, EXTENDED RELEASE 24 HR ORAL
Qty: 360 TABLET | Refills: 1 | Status: SHIPPED | OUTPATIENT
Start: 2024-07-09

## 2024-07-09 NOTE — TELEPHONE ENCOUNTER
Medication Question or Refill    Needs a refill    What medication are you calling about (include dose and sig)?:     Preferred Pharmacy:     [facility] [id number] [facility] - [place] - [address]  [address] [place] [address]  Phone: [phone] Fax: [phone]      Controlled Substance Agreement on file:   CSA -- Patient Level:    CSA: None found at the patient level.       Who prescribed the medication?: KAH    Do you need a refill? Yes    Okay to leave a detailed message?: Yes at Home number on file [phone] (home)

## 2024-11-20 ENCOUNTER — TRANSFERRED RECORDS (OUTPATIENT)
Dept: HEALTH INFORMATION MANAGEMENT | Facility: CLINIC | Age: 59
End: 2024-11-20
Payer: COMMERCIAL

## 2024-12-04 ENCOUNTER — OFFICE VISIT (OUTPATIENT)
Dept: FAMILY MEDICINE | Facility: CLINIC | Age: 59
End: 2024-12-04
Payer: COMMERCIAL

## 2024-12-04 VITALS
HEIGHT: 67 IN | OXYGEN SATURATION: 95 % | WEIGHT: 231 LBS | SYSTOLIC BLOOD PRESSURE: 136 MMHG | BODY MASS INDEX: 36.26 KG/M2 | DIASTOLIC BLOOD PRESSURE: 82 MMHG | RESPIRATION RATE: 20 BRPM | HEART RATE: 69 BPM | TEMPERATURE: 97.3 F

## 2024-12-04 DIAGNOSIS — E11.43 TYPE 2 DIABETES MELLITUS WITH DIABETIC AUTONOMIC NEUROPATHY, WITHOUT LONG-TERM CURRENT USE OF INSULIN (H): Primary | ICD-10-CM

## 2024-12-04 DIAGNOSIS — I47.10 SVT (SUPRAVENTRICULAR TACHYCARDIA) (H): ICD-10-CM

## 2024-12-04 LAB
EST. AVERAGE GLUCOSE BLD GHB EST-MCNC: 194 MG/DL
HBA1C MFR BLD: 8.4 % (ref 0–5.6)

## 2024-12-04 PROCEDURE — 83036 HEMOGLOBIN GLYCOSYLATED A1C: CPT | Performed by: PHYSICIAN ASSISTANT

## 2024-12-04 PROCEDURE — 99214 OFFICE O/P EST MOD 30 MIN: CPT | Performed by: PHYSICIAN ASSISTANT

## 2024-12-04 PROCEDURE — 36415 COLL VENOUS BLD VENIPUNCTURE: CPT | Performed by: PHYSICIAN ASSISTANT

## 2024-12-04 RX ORDER — GLIPIZIDE 10 MG/1
20 TABLET, FILM COATED, EXTENDED RELEASE ORAL DAILY
Qty: 180 TABLET | Refills: 1 | Status: SHIPPED | OUTPATIENT
Start: 2024-12-04

## 2024-12-04 NOTE — PROGRESS NOTES
"  Assessment & Plan     (E11.43) Type 2 diabetes mellitus with diabetic autonomic neuropathy, without long-term current use of insulin (H)  (primary encounter diagnosis)  Comment: Questionable control  Plan: HEMOGLOBIN A1C        A1c is elevated will increase his glipizide    (I47.10) SVT (supraventricular tachycardia) (H)  Comment: Recurrent bouts  Plan: Adult Cardiology Hannahal Chalino Referral        Referral to cardiology        MED REC REQUIRED  Post Medication Reconciliation Status:   BMI  Estimated body mass index is 36.18 kg/m  as calculated from the following:    Height as of this encounter: 1.702 m (5' 7\").    Weight as of this encounter: 104.8 kg (231 lb).             Yan Clarke is a 59 year old, presenting for the following health issues:  ER F/U (11/20/24 [facility] ER for Supraventricular Tachycardia)    Patient recently had an emergency department visit for SVT  His smart watch told him he has had a couple more episodes since that time  Before COVID he was scheduled for an ablation for SVT but then that got canceled with COVID as it was considered elective and never got rescheduled he needs a new referral to cardiology  He does have type 2 diabetes questionable control there is some question with his medications but I think we got that straightened out           ED/UC Followup:    Facility:  [facility]   Date of visit: 11/20/24  Reason for visit: Supraventricular Tachycardia   Current Status:           Objective    /82 (BP Location: Right arm, Patient Position: Sitting, Cuff Size: Adult Large)   Pulse 69   Temp 97.3  F (36.3  C) (Tympanic)   Resp 20   Ht 1.702 m (5' 7\")   Wt 104.8 kg (231 lb)   SpO2 95%   BMI 36.18 kg/m    Body mass index is 36.18 kg/m .  Physical Exam attentive no acute distress  Blood pressure is normal  Lungs clear well ventilated  Cardiovascular regular rate and rhythm              Signed Electronically by: KERLINE Land    "

## 2025-01-14 ENCOUNTER — HOSPITAL ENCOUNTER (OUTPATIENT)
Dept: CARDIOLOGY | Facility: CLINIC | Age: 60
Discharge: HOME | End: 2025-01-14
Attending: INTERNAL MEDICINE
Payer: COMMERCIAL

## 2025-01-14 DIAGNOSIS — I47.10 SVT (SUPRAVENTRICULAR TACHYCARDIA): ICD-10-CM

## 2025-01-14 LAB — LVEF ECHO: NORMAL

## 2025-01-14 PROCEDURE — 93306 TTE W/DOPPLER COMPLETE: CPT | Mod: 26 | Performed by: GENERAL ACUTE CARE HOSPITAL

## 2025-01-14 PROCEDURE — 93306 TTE W/DOPPLER COMPLETE: CPT

## 2025-01-15 DIAGNOSIS — E11.43 TYPE 2 DIABETES MELLITUS WITH DIABETIC AUTONOMIC NEUROPATHY, WITHOUT LONG-TERM CURRENT USE OF INSULIN (H): ICD-10-CM

## 2025-01-15 RX ORDER — METFORMIN HYDROCHLORIDE 500 MG/1
2000 TABLET, EXTENDED RELEASE ORAL
Qty: 360 TABLET | Refills: 0 | Status: SHIPPED | OUTPATIENT
Start: 2025-01-15

## 2025-01-30 ENCOUNTER — OFFICE VISIT (OUTPATIENT)
Dept: CARDIOLOGY | Facility: CLINIC | Age: 60
End: 2025-01-30
Attending: INTERNAL MEDICINE
Payer: COMMERCIAL

## 2025-01-30 VITALS
DIASTOLIC BLOOD PRESSURE: 64 MMHG | WEIGHT: 240.5 LBS | OXYGEN SATURATION: 95 % | BODY MASS INDEX: 37.67 KG/M2 | HEART RATE: 89 BPM | SYSTOLIC BLOOD PRESSURE: 120 MMHG | RESPIRATION RATE: 20 BRPM

## 2025-01-30 DIAGNOSIS — I47.10 SVT (SUPRAVENTRICULAR TACHYCARDIA): Primary | ICD-10-CM

## 2025-01-30 NOTE — PROGRESS NOTES
[facility]  Cardiac Electrophysiology  [address]  [place] [address]   Office: [phone]  Fax: [phone]     Patient: Vince Martinez   : 1965       CHIEF COMPLAINT/REASON FOR VISIT  Supraventricular tachycardia      Assessment/Recommendations     Supraventricular tachycardia - symptomatic.  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  We reviewed SVT and reviewed treatment options including electrophysiology study and ablation vs medical therapy vs observation.  We reviewed the nature of EP studies and ablation for SVT, success rates depending on inducibility and specific SVT mechanism, procedural risks (including groin hematoma, vascular injury, tamponade, heart block, stroke) and recovery expectations.  He would prefer catheter ablation  - EPS/possible ablation for SVT, MAC, hold metoprolol 5 days prior  - continue metoprolol 50mg twice daily    Follow up: as above           History of Present Illness   Vince Martinez is a 59 year old male with supraventricular tachycardia, HTN referred by Dr. Deluna for consultation regarding SVT.    Mr. Martinez's SVT history is as summarized below:  Symptoms: palpitations  Symptom onset date: ~  Diagnosis date: ~  Admissions/ER visits: 2024 (ER - adenosine)  Prior medical therapies: metoprolol ~  Prior adenosine/DCCVs: ~ (adenosine), 2024 (adenosine 6mg IV)  Prior ablations: none    He notes SVT episodes occurring multiple times per week.  He denies chest pain, syncope.       Physical Examination  Review of Systems   VITALS: /64 (BP Location: Left arm, Patient Position: Sitting, Cuff Size: Adult Regular)   Pulse 89   Resp 20   Wt 109.1 kg (240 lb 8 oz)   SpO2 95%   BMI 37.67 kg/m    Wt Readings from Last 3 Encounters:   24 105.8 kg (233 lb 4.8 oz)   24 104.8 kg (231 lb)   24 105.1 kg (231 lb 11.2 oz)     CONSTITUTIONAL: well nourished, comfortable, no  distress  EYES:  Conjunctivae pink, sclerae clear.    E/N/T:  Oral mucosa pink  RESPIRATORY:  .Respiratory effort is normal  CARDIOVASCULAR:  normal S1 and S2  GASTROINTESTINAL:  Abdomen without masses or tenderness  EXTREMITIES:  No clubbing or cyanosis.    MUSCULOSKELETAL:  Overall grossly normal muscle strength  SKIN:  Overall, skin warm and dry, no lesions.  NEURO/PSYCH:  Oriented x 3 with normal affect.   Constitutional:  No weight loss or loss of appetite    Eyes:  No difficulty with vision, no double vision, no dry eyes  ENT:  No sore throat, difficulty swallowing; changes in hearing or tinnitus  Cardiovascular: As detailed above  Respiratory:  No cough  Musculoskeletal  No joint pain, muscle aches  Neurologic:  No syncope, lightheadedness, fainting spells   Hematologic: No easy bruising, excessive bleeding tendency   Gastrointestinal:  No jaundice, abdominal pain or abdominal bloating  Genitourinary: No changes in urinary habits, no trouble urinating    Psychiatric: No anxiety or depression      Medical History  Surgical History   No past medical history on file. No past surgical history on file.      Family History Social History   No family history on file.     Social History     Tobacco Use    Smoking status: Never     Passive exposure: Never    Smokeless tobacco: Never   Vaping Use    Vaping status: Never Used   Substance Use Topics    Alcohol use: Yes    Drug use: Never         Medications  Allergies     Current Outpatient Medications:     atorvastatin (LIPITOR) 40 MG tablet, Take 1 tablet (40 mg) by mouth daily., Disp: 90 tablet, Rfl: 3    glipiZIDE (GLUCOTROL XL) 10 MG 24 hr tablet, Take 2 tablets (20 mg) by mouth daily., Disp: 180 tablet, Rfl: 1    JARDIANCE 10 MG TABS tablet, TAKE 1 TABLET (10 MG) BY MOUTH DAILY., Disp: 90 tablet, Rfl: 1    Lancets (ONETOUCH DELICA PLUS [id number]) MISC, PLEASE SEE ATTACHED FOR DETAILED DIRECTIONS, Disp: , Rfl:     levothyroxine (SYNTHROID/LEVOTHROID) 200 MCG tablet,  "Take 1 tablet (200 mcg) by mouth daily, Disp: 90 tablet, Rfl: 3    metFORMIN (GLUCOPHAGE XR) 500 MG 24 hr tablet, TAKE 4 TABLETS (2,000 MG) BY MOUTH DAILY (WITH BREAKFAST), Disp: 360 tablet, Rfl: 0    metoprolol tartrate (LOPRESSOR) 50 MG tablet, Take 1 tablet (50 mg) by mouth 2 times daily., Disp: 180 tablet, Rfl: 3    ONETOUCH VERIO IQ test strip, 1 strip by In Vitro route 3 times daily, Disp: , Rfl:     SUMAtriptan (IMITREX) 50 MG tablet, Take 1 tablet (50 mg) by mouth at onset of headache for migraine, Disp: 9 tablet, Rfl: 11    traZODone (DESYREL) 50 MG tablet, TAKE 1 TABLET BY MOUTH EVERYDAY AT BEDTIME, Disp: 90 tablet, Rfl: 1   No Known Allergies       Lab Results    Chemistry CBC Cardiac Enzymes/BNP/TSH/INR   Recent Labs   Lab Test 05/08/24  1532      POTASSIUM 4.8   CHLORIDE 102   CO2 26   *   BUN 18.3   CR 1.16   GFRESTIMATED 73   WILLAM 9.5     Recent Labs   Lab Test 05/08/24  1532   CR 1.16          No results for input(s): \"WBC\", \"HGB\", \"HCT\", \"MCV\", \"PLT\" in the last 99639 hours.  No results for input(s): \"HGB\" in the last 18820 hours. No results for input(s): \"TROPONINI\" in the last 52125 hours.  No results for input(s): \"BNP\", \"NTBNPI\", \"NTBNP\" in the last 71532 hours.  Recent Labs   Lab Test 05/08/24  1532   TSH 57.00*     No results for input(s): \"INR\" in the last 49362 hours.      Data Review    ECGs (tracings independently reviewed)  11/20/2024 - SR 92bpm, SD 168ms  11/20/2024 - regular short RP NCT 147bpm    1/14/2025 TTE  1. Left ventricular chamber size, wall thickness and systolic function are  normal. The visually estimated left ventricular ejection fraction is 60-65%.  2. Right ventricular chamber size and systolic function are normal.  3. No hemodynamically significant valvular abnormalities.  4. Non-aneurysmal enlargement of the aortic root measuring 4.1 cm.  5. No prior study available for comparison.       Cc: Precious Deluna MD, No Ref-Primary, Physician    Stephanie " Светлана Mercado MD  1/30/2025  4:10 PM

## 2025-01-30 NOTE — PATIENT INSTRUCTIONS
[facility]  Cardiac Electrophysiology  [address]  [place] [address]   Office: [phone]  Fax: [phone]     Thank you for seeing us in clinic today - it is a pleasure to be a part of your care team.  Below is a summary of our plan from today's visit.       You have supraventricular tachycardia (SVT).  We reviewed SVT and reviewed treatment options including electrophysiology study and ablation vs medical therapy vs observation.  We reviewed the nature of EP studies and ablation for SVT, success rates depending on inducibility and specific SVT mechanism, procedural risks and recovery expectations.    We will plan for the following:  - our office will work with you to coordinate an EP study and ablation for SVT  - continue metoprolol 50mg twice daily     Please do not hesitate to be in touch with our office at [phone] with any questions that may arise.       Thank you for trusting us with your care,    Stephanie Mercado MD  Clinical Cardiac Electrophysiology  [facility]  [address]  [place] [address]   Office: [phone]  Fax: [phone]

## 2025-01-30 NOTE — LETTER
2025    KERLINE Land  [address] [address]    RE: Vince Martinez       Dear Colleague,     I had the pleasure of seeing Vince Martinez in the [facility].     [facility]  Cardiac Electrophysiology  [address]  [place] [address]   Office: [phone]  Fax: [phone]     Patient: Vince Martinez   : 1965       CHIEF COMPLAINT/REASON FOR VISIT  Supraventricular tachycardia      Assessment/Recommendations     Supraventricular tachycardia - symptomatic.  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  We reviewed SVT and reviewed treatment options including electrophysiology study and ablation vs medical therapy vs observation.  We reviewed the nature of EP studies and ablation for SVT, success rates depending on inducibility and specific SVT mechanism, procedural risks (including groin hematoma, vascular injury, tamponade, heart block, stroke) and recovery expectations.  He would prefer catheter ablation  - EPS/possible ablation for SVT, MAC, hold metoprolol 5 days prior  - continue metoprolol 50mg twice daily    Follow up: as above           History of Present Illness   Vince Martinez is a 59 year old male with supraventricular tachycardia, HTN referred by Dr. Deluna for consultation regarding SVT.    Mr. Martinez's SVT history is as summarized below:  Symptoms: palpitations  Symptom onset date: ~  Diagnosis date: ~  Admissions/ER visits: 2024 (ER - adenosine)  Prior medical therapies: metoprolol ~  Prior adenosine/DCCVs: ~ (adenosine), 2024 (adenosine 6mg IV)  Prior ablations: none    He notes SVT episodes occurring multiple times per week.  He denies chest pain, syncope.       Physical Examination  Review of Systems   VITALS: /64 (BP Location: Left arm, Patient Position: Sitting, Cuff Size: Adult Regular)   Pulse 89   Resp 20   Wt 109.1 kg (240 lb 8 oz)   SpO2 95%    BMI 37.67 kg/m    Wt Readings from Last 3 Encounters:   12/19/24 105.8 kg (233 lb 4.8 oz)   12/04/24 104.8 kg (231 lb)   05/08/24 105.1 kg (231 lb 11.2 oz)     CONSTITUTIONAL: well nourished, comfortable, no distress  EYES:  Conjunctivae pink, sclerae clear.    E/N/T:  Oral mucosa pink  RESPIRATORY:  .Respiratory effort is normal  CARDIOVASCULAR:  normal S1 and S2  GASTROINTESTINAL:  Abdomen without masses or tenderness  EXTREMITIES:  No clubbing or cyanosis.    MUSCULOSKELETAL:  Overall grossly normal muscle strength  SKIN:  Overall, skin warm and dry, no lesions.  NEURO/PSYCH:  Oriented x 3 with normal affect.   Constitutional:  No weight loss or loss of appetite    Eyes:  No difficulty with vision, no double vision, no dry eyes  ENT:  No sore throat, difficulty swallowing; changes in hearing or tinnitus  Cardiovascular: As detailed above  Respiratory:  No cough  Musculoskeletal  No joint pain, muscle aches  Neurologic:  No syncope, lightheadedness, fainting spells   Hematologic: No easy bruising, excessive bleeding tendency   Gastrointestinal:  No jaundice, abdominal pain or abdominal bloating  Genitourinary: No changes in urinary habits, no trouble urinating    Psychiatric: No anxiety or depression      Medical History  Surgical History   No past medical history on file. No past surgical history on file.      Family History Social History   No family history on file.     Social History     Tobacco Use     Smoking status: Never     Passive exposure: Never     Smokeless tobacco: Never   Vaping Use     Vaping status: Never Used   Substance Use Topics     Alcohol use: Yes     Drug use: Never         Medications  Allergies     Current Outpatient Medications:      atorvastatin (LIPITOR) 40 MG tablet, Take 1 tablet (40 mg) by mouth daily., Disp: 90 tablet, Rfl: 3     glipiZIDE (GLUCOTROL XL) 10 MG 24 hr tablet, Take 2 tablets (20 mg) by mouth daily., Disp: 180 tablet, Rfl: 1     JARDIANCE 10 MG TABS tablet, TAKE 1  "TABLET (10 MG) BY MOUTH DAILY., Disp: 90 tablet, Rfl: 1     Lancets (ONETOUCH DELICA PLUS [id number]) MISC, PLEASE SEE ATTACHED FOR DETAILED DIRECTIONS, Disp: , Rfl:      levothyroxine (SYNTHROID/LEVOTHROID) 200 MCG tablet, Take 1 tablet (200 mcg) by mouth daily, Disp: 90 tablet, Rfl: 3     metFORMIN (GLUCOPHAGE XR) 500 MG 24 hr tablet, TAKE 4 TABLETS (2,000 MG) BY MOUTH DAILY (WITH BREAKFAST), Disp: 360 tablet, Rfl: 0     metoprolol tartrate (LOPRESSOR) 50 MG tablet, Take 1 tablet (50 mg) by mouth 2 times daily., Disp: 180 tablet, Rfl: 3     ONETOUCH VERIO IQ test strip, 1 strip by In Vitro route 3 times daily, Disp: , Rfl:      SUMAtriptan (IMITREX) 50 MG tablet, Take 1 tablet (50 mg) by mouth at onset of headache for migraine, Disp: 9 tablet, Rfl: 11     traZODone (DESYREL) 50 MG tablet, TAKE 1 TABLET BY MOUTH EVERYDAY AT BEDTIME, Disp: 90 tablet, Rfl: 1   No Known Allergies       Lab Results    Chemistry CBC Cardiac Enzymes/BNP/TSH/INR   Recent Labs   Lab Test 05/08/24  1532      POTASSIUM 4.8   CHLORIDE 102   CO2 26   *   BUN 18.3   CR 1.16   GFRESTIMATED 73   WILLAM 9.5     Recent Labs   Lab Test 05/08/24  1532   CR 1.16          No results for input(s): \"WBC\", \"HGB\", \"HCT\", \"MCV\", \"PLT\" in the last 11755 hours.  No results for input(s): \"HGB\" in the last 18577 hours. No results for input(s): \"TROPONINI\" in the last 08517 hours.  No results for input(s): \"BNP\", \"NTBNPI\", \"NTBNP\" in the last 70597 hours.  Recent Labs   Lab Test 05/08/24  1532   TSH 57.00*     No results for input(s): \"INR\" in the last 15360 hours.      Data Review    ECGs (tracings independently reviewed)  11/20/2024 - SR 92bpm, MO 168ms  11/20/2024 - regular short RP NCT 147bpm    1/14/2025 TTE  1. Left ventricular chamber size, wall thickness and systolic function are  normal. The visually estimated left ventricular ejection fraction is 60-65%.  2. Right ventricular chamber size and systolic function are normal.  3. No " hemodynamically significant valvular abnormalities.  4. Non-aneurysmal enlargement of the aortic root measuring 4.1 cm.  5. No prior study available for comparison.       Cc: Precious Deluna MD, No Ref-Primary, Physician    Stephanie Mercado MD  1/30/2025  4:10 PM        Thank you for allowing me to participate in the care of your patient.      Sincerely,     Stephanie Mercado MD     [facility]  cc:   Precious Deluna MD  [address]  [place] [address]

## 2025-02-12 ENCOUNTER — TRANSFERRED RECORDS (OUTPATIENT)
Dept: MULTI SPECIALTY CLINIC | Facility: CLINIC | Age: 60
End: 2025-02-12
Payer: COMMERCIAL

## 2025-02-12 LAB — RETINOPATHY: NORMAL

## 2025-02-18 ENCOUNTER — OFFICE VISIT (OUTPATIENT)
Dept: FAMILY MEDICINE | Facility: CLINIC | Age: 60
End: 2025-02-18
Payer: COMMERCIAL

## 2025-02-18 VITALS
HEART RATE: 74 BPM | RESPIRATION RATE: 18 BRPM | OXYGEN SATURATION: 97 % | DIASTOLIC BLOOD PRESSURE: 76 MMHG | HEIGHT: 67 IN | SYSTOLIC BLOOD PRESSURE: 132 MMHG | BODY MASS INDEX: 38.74 KG/M2 | TEMPERATURE: 97.3 F | WEIGHT: 246.8 LBS

## 2025-02-18 DIAGNOSIS — I47.10 SVT (SUPRAVENTRICULAR TACHYCARDIA): Primary | ICD-10-CM

## 2025-02-18 DIAGNOSIS — Z01.818 PRE-OP EXAM: ICD-10-CM

## 2025-02-18 DIAGNOSIS — E66.01 MORBID OBESITY (H): ICD-10-CM

## 2025-02-18 DIAGNOSIS — G47.33 OSA (OBSTRUCTIVE SLEEP APNEA): ICD-10-CM

## 2025-02-18 DIAGNOSIS — F51.02 ADJUSTMENT INSOMNIA: ICD-10-CM

## 2025-02-18 DIAGNOSIS — E11.43 TYPE 2 DIABETES MELLITUS WITH DIABETIC AUTONOMIC NEUROPATHY, WITHOUT LONG-TERM CURRENT USE OF INSULIN (H): ICD-10-CM

## 2025-02-18 LAB
ANION GAP SERPL CALCULATED.3IONS-SCNC: 11 MMOL/L (ref 7–15)
BUN SERPL-MCNC: 15.1 MG/DL (ref 8–23)
CALCIUM SERPL-MCNC: 9.6 MG/DL (ref 8.8–10.4)
CHLORIDE SERPL-SCNC: 104 MMOL/L (ref 98–107)
CREAT SERPL-MCNC: 0.96 MG/DL (ref 0.67–1.17)
EGFRCR SERPLBLD CKD-EPI 2021: 90 ML/MIN/1.73M2
ERYTHROCYTE [DISTWIDTH] IN BLOOD BY AUTOMATED COUNT: 11.9 % (ref 10–15)
GLUCOSE SERPL-MCNC: 95 MG/DL (ref 70–99)
HCO3 SERPL-SCNC: 25 MMOL/L (ref 22–29)
HCT VFR BLD AUTO: 47.2 % (ref 40–53)
HGB BLD-MCNC: 16.4 G/DL (ref 13.3–17.7)
MCH RBC QN AUTO: 31.1 PG (ref 26.5–33)
MCHC RBC AUTO-ENTMCNC: 34.7 G/DL (ref 31.5–36.5)
MCV RBC AUTO: 89 FL (ref 78–100)
PLATELET # BLD AUTO: 318 10E3/UL (ref 150–450)
POTASSIUM SERPL-SCNC: 4.1 MMOL/L (ref 3.4–5.3)
RBC # BLD AUTO: 5.28 10E6/UL (ref 4.4–5.9)
SODIUM SERPL-SCNC: 140 MMOL/L (ref 135–145)
WBC # BLD AUTO: 12.2 10E3/UL (ref 4–11)

## 2025-02-18 PROCEDURE — 99214 OFFICE O/P EST MOD 30 MIN: CPT | Performed by: PHYSICIAN ASSISTANT

## 2025-02-18 PROCEDURE — 85027 COMPLETE CBC AUTOMATED: CPT | Performed by: PHYSICIAN ASSISTANT

## 2025-02-18 PROCEDURE — G2211 COMPLEX E/M VISIT ADD ON: HCPCS | Performed by: PHYSICIAN ASSISTANT

## 2025-02-18 PROCEDURE — 36415 COLL VENOUS BLD VENIPUNCTURE: CPT | Performed by: PHYSICIAN ASSISTANT

## 2025-02-18 PROCEDURE — 80048 BASIC METABOLIC PNL TOTAL CA: CPT | Performed by: PHYSICIAN ASSISTANT

## 2025-02-18 RX ORDER — TRAZODONE HYDROCHLORIDE 50 MG/1
50 TABLET ORAL AT BEDTIME
Qty: 90 TABLET | Refills: 1 | Status: SHIPPED | OUTPATIENT
Start: 2025-02-18

## 2025-02-18 NOTE — H&P (VIEW-ONLY)
Preoperative Evaluation  [facility]  [address] [address]  Phone: [phone]  Fax: [phone]  Primary Provider: KERLINE Land  Pre-op Performing Provider: KERLINE Land  Feb 18, 2025 2/18/2025   Surgical Information   What procedure is being done? pre-operation physical    Facility or Hospital where procedure/surgery will be performed: [facility]    Who is doing the procedure / surgery? ?    Date of surgery / procedure: march 18th    Time of surgery / procedure: 7am    Where do you plan to recover after surgery? at home with family        Proxy-reported     Fax number for surgical facility: Note does not need to be faxed, will be available electronically in Epic.    Assessment & Plan     The proposed surgical procedure is considered INTERMEDIATE risk.    (I47.10) SVT (supraventricular tachycardia)  (primary encounter diagnosis)  Comment: History of same for a number of years  Plan: Basic metabolic panel, CBC with platelets        For ablation    (F51.02) Adjustment insomnia  Comment: Stable  Plan: traZODone (DESYREL) 50 MG tablet        Continue current medication    (E66.01) Morbid obesity (H)  Comment: Stable  Plan: Basic metabolic panel, CBC with platelets            (E11.43) Type 2 diabetes mellitus with diabetic autonomic neuropathy, without long-term current use of insulin (H)  Comment: Well-controlled  Plan: Basic metabolic panel        Labs pending    (Z01.818) Pre-op exam  Comment: Here for preoperative evaluation for cardiac ablation for SVT  Plan: Basic metabolic panel, CBC with platelets            (G47.33) JODY (obstructive sleep apnea)  Comment: Uses CPAP  Plan: Continue current therapy               Per cardiology to hold metoprolol 5 days prior to procedure.  He will hold his Jardiance and metformin 48 hours prior to procedure may continue the rest of his meds up until the evening before  surgery    Recommendation  Approval given to proceed with proposed procedure pending review of diagnostic evaluation.    Yan Clarke is a 59 year old, presenting for the following:  Pre-Op Exam (DOS 3/18/25 Dr Mercado Ablation Supraventricular Tachycardia at [facility] )          2/18/2025    12:45 PM   Additional Questions   Roomed by BRYAN Low     Via the Health Maintenance questionnaire, the patient has reported the following services have been completed -Eye Exam: [facility] 2025-02-12, this information has been sent to the abstraction team.  HPI related to upcoming procedure: Longstanding history of SVT.  Slightly more symptomatic as of late with more frequent episodes.  Scheduled for ablation        2/18/2025   Pre-Op Questionnaire   Have you ever had a heart attack or stroke? No    Have you ever had surgery on your heart or blood vessels, such as a stent placement, a coronary artery bypass, or surgery on an artery in your head, neck, heart, or legs? No    Do you have chest pain with activity? No    Do you have a history of heart failure? No    Do you currently have a cold, bronchitis or symptoms of other infection? No    Do you have a cough, shortness of breath, or wheezing? No    Do you or anyone in your family have previous history of blood clots? No    Do you or does anyone in your family have a serious bleeding problem such as prolonged bleeding following surgeries or cuts? No    Have you ever had problems with anemia or been told to take iron pills? No    Have you had any abnormal blood loss such as black, tarry or bloody stools? No    Have you ever had a blood transfusion? No    Are you willing to have a blood transfusion if it is medically needed before, during, or after your surgery? Yes    Have you or any of your relatives ever had problems with anesthesia? No    Do you have sleep apnea, excessive snoring or daytime drowsiness? (!) YES    Do you have a CPAP  machine? Yes    Do you have any artifical heart valves or other implanted medical devices like a pacemaker, defibrillator, or continuous glucose monitor? No    Do you have artificial joints? No    Are you allergic to latex? No        Proxy-reported     Health Care Directive  Patient does not have a Health Care Directive:     Preoperative Review of    reviewed - no record of controlled substances prescribed.          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  No past surgical history on file.  Current Outpatient Medications   Medication Sig Dispense Refill    atorvastatin (LIPITOR) 40 MG tablet Take 1 tablet (40 mg) by mouth daily. 90 tablet 3    glipiZIDE (GLUCOTROL XL) 10 MG 24 hr tablet Take 2 tablets (20 mg) by mouth daily. 180 tablet 1    JARDIANCE 10 MG TABS tablet TAKE 1 TABLET (10 MG) BY MOUTH DAILY. 90 tablet 1    Lancets (ONETOUCH DELICA PLUS [id number]) MISC PLEASE SEE ATTACHED FOR DETAILED DIRECTIONS      levothyroxine (SYNTHROID/LEVOTHROID) 200 MCG tablet Take 1 tablet (200 mcg) by mouth daily 90 tablet 3  "   metFORMIN (GLUCOPHAGE XR) 500 MG 24 hr tablet TAKE 4 TABLETS (2,000 MG) BY MOUTH DAILY (WITH BREAKFAST) 360 tablet 0    metoprolol tartrate (LOPRESSOR) 50 MG tablet Take 1 tablet (50 mg) by mouth 2 times daily. 180 tablet 3    ONETOUCH VERIO IQ test strip 1 strip by In Vitro route 3 times daily      SUMAtriptan (IMITREX) 50 MG tablet Take 1 tablet (50 mg) by mouth at onset of headache for migraine 9 tablet 11    traZODone (DESYREL) 50 MG tablet Take 1 tablet (50 mg) by mouth at bedtime. 90 tablet 1       No Known Allergies     Social History     Tobacco Use    Smoking status: Never     Passive exposure: Never    Smokeless tobacco: Never   Substance Use Topics    Alcohol use: Yes       History   Drug Use Unknown      No family history of anesthesia problems no family history of bleeding disorders        Objective    /76   Pulse 74   Temp 97.3  F (36.3  C) (Tympanic)   Resp 18   Ht 1.702 m (5' 7\")   Wt 111.9 kg (246 lb 12.8 oz)   SpO2 97%   BMI 38.65 kg/m     Estimated body mass index is 38.65 kg/m  as calculated from the following:    Height as of this encounter: 1.702 m (5' 7\").    Weight as of this encounter: 111.9 kg (246 lb 12.8 oz).  Physical Exam  Vitals and nursing note reviewed.   Constitutional:       Appearance: Normal appearance.   HENT:      Head: Normocephalic and atraumatic.      Right Ear: Tympanic membrane normal.      Left Ear: Tympanic membrane normal.      Nose: Nose normal. No congestion or rhinorrhea.      Mouth/Throat:      Mouth: Mucous membranes are moist.   Eyes:      Conjunctiva/sclera: Conjunctivae normal.   Cardiovascular:      Rate and Rhythm: Normal rate and regular rhythm.      Heart sounds: Normal heart sounds.   Pulmonary:      Effort: Pulmonary effort is normal.      Breath sounds: Normal breath sounds.   Abdominal:      General: Abdomen is flat. Bowel sounds are normal.      Palpations: There is no mass.      Tenderness: There is no abdominal tenderness. "   Musculoskeletal:         General: Normal range of motion.      Cervical back: Normal range of motion and neck supple.      Right lower leg: No edema.      Left lower leg: No edema.   Lymphadenopathy:      Cervical: No cervical adenopathy.   Skin:     General: Skin is warm and dry.   Neurological:      General: No focal deficit present.   Psychiatric:         Mood and Affect: Mood normal.         Behavior: Behavior normal.         Thought Content: Thought content normal.         Judgment: Judgment normal.           Recent Labs   Lab Test 12/04/24  1605 05/08/24  1532   NA  --  138   POTASSIUM  --  4.8   CR  --  1.16   A1C 8.4* 8.0*        Diagnostics  Labs pending at this time.  Results will be reviewed when available.   No EKG this visit, completed in the last 90 days.  Recent normal echo    Revised Cardiac Risk Index (RCRI)  The patient has the following serious cardiovascular risks for perioperative complications:   - No serious cardiac risks = 0 points     RCRI Interpretation: 0 points: Class I (very low risk - 0.4% complication rate)         Signed Electronically by: KERLINE Land  A copy of this evaluation report is provided to the requesting physician.

## 2025-02-18 NOTE — PROGRESS NOTES
Preoperative Evaluation  [facility]  [address] [address]  Phone: [phone]  Fax: [phone]  Primary Provider: KERLINE Land  Pre-op Performing Provider: KERLINE Land  Feb 18, 2025 2/18/2025   Surgical Information   What procedure is being done? pre-operation physical    Facility or Hospital where procedure/surgery will be performed: [facility]    Who is doing the procedure / surgery? ?    Date of surgery / procedure: march 18th    Time of surgery / procedure: 7am    Where do you plan to recover after surgery? at home with family        Proxy-reported     Fax number for surgical facility: Note does not need to be faxed, will be available electronically in Epic.    Assessment & Plan     The proposed surgical procedure is considered INTERMEDIATE risk.    (I47.10) SVT (supraventricular tachycardia)  (primary encounter diagnosis)  Comment: History of same for a number of years  Plan: Basic metabolic panel, CBC with platelets        For ablation    (F51.02) Adjustment insomnia  Comment: Stable  Plan: traZODone (DESYREL) 50 MG tablet        Continue current medication    (E66.01) Morbid obesity (H)  Comment: Stable  Plan: Basic metabolic panel, CBC with platelets            (E11.43) Type 2 diabetes mellitus with diabetic autonomic neuropathy, without long-term current use of insulin (H)  Comment: Well-controlled  Plan: Basic metabolic panel        Labs pending    (Z01.818) Pre-op exam  Comment: Here for preoperative evaluation for cardiac ablation for SVT  Plan: Basic metabolic panel, CBC with platelets            (G47.33) JODY (obstructive sleep apnea)  Comment: Uses CPAP  Plan: Continue current therapy               Per cardiology to hold metoprolol 5 days prior to procedure.  He will hold his Jardiance and metformin 48 hours prior to procedure may continue the rest of his meds up until the evening before  surgery    Recommendation  Approval given to proceed with proposed procedure pending review of diagnostic evaluation.    Yan Clarke is a 59 year old, presenting for the following:  Pre-Op Exam (DOS 3/18/25 Dr Mercado Ablation Supraventricular Tachycardia at [facility] )          2/18/2025    12:45 PM   Additional Questions   Roomed by BRYAN Low     Via the Health Maintenance questionnaire, the patient has reported the following services have been completed -Eye Exam: [facility] 2025-02-12, this information has been sent to the abstraction team.  HPI related to upcoming procedure: Longstanding history of SVT.  Slightly more symptomatic as of late with more frequent episodes.  Scheduled for ablation        2/18/2025   Pre-Op Questionnaire   Have you ever had a heart attack or stroke? No    Have you ever had surgery on your heart or blood vessels, such as a stent placement, a coronary artery bypass, or surgery on an artery in your head, neck, heart, or legs? No    Do you have chest pain with activity? No    Do you have a history of heart failure? No    Do you currently have a cold, bronchitis or symptoms of other infection? No    Do you have a cough, shortness of breath, or wheezing? No    Do you or anyone in your family have previous history of blood clots? No    Do you or does anyone in your family have a serious bleeding problem such as prolonged bleeding following surgeries or cuts? No    Have you ever had problems with anemia or been told to take iron pills? No    Have you had any abnormal blood loss such as black, tarry or bloody stools? No    Have you ever had a blood transfusion? No    Are you willing to have a blood transfusion if it is medically needed before, during, or after your surgery? Yes    Have you or any of your relatives ever had problems with anesthesia? No    Do you have sleep apnea, excessive snoring or daytime drowsiness? (!) YES    Do you have a CPAP  machine? Yes    Do you have any artifical heart valves or other implanted medical devices like a pacemaker, defibrillator, or continuous glucose monitor? No    Do you have artificial joints? No    Are you allergic to latex? No        Proxy-reported     Health Care Directive  Patient does not have a Health Care Directive:     Preoperative Review of    reviewed - no record of controlled substances prescribed.          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  No past surgical history on file.  Current Outpatient Medications   Medication Sig Dispense Refill    atorvastatin (LIPITOR) 40 MG tablet Take 1 tablet (40 mg) by mouth daily. 90 tablet 3    glipiZIDE (GLUCOTROL XL) 10 MG 24 hr tablet Take 2 tablets (20 mg) by mouth daily. 180 tablet 1    JARDIANCE 10 MG TABS tablet TAKE 1 TABLET (10 MG) BY MOUTH DAILY. 90 tablet 1    Lancets (ONETOUCH DELICA PLUS [id number]) MISC PLEASE SEE ATTACHED FOR DETAILED DIRECTIONS      levothyroxine (SYNTHROID/LEVOTHROID) 200 MCG tablet Take 1 tablet (200 mcg) by mouth daily 90 tablet 3  "   metFORMIN (GLUCOPHAGE XR) 500 MG 24 hr tablet TAKE 4 TABLETS (2,000 MG) BY MOUTH DAILY (WITH BREAKFAST) 360 tablet 0    metoprolol tartrate (LOPRESSOR) 50 MG tablet Take 1 tablet (50 mg) by mouth 2 times daily. 180 tablet 3    ONETOUCH VERIO IQ test strip 1 strip by In Vitro route 3 times daily      SUMAtriptan (IMITREX) 50 MG tablet Take 1 tablet (50 mg) by mouth at onset of headache for migraine 9 tablet 11    traZODone (DESYREL) 50 MG tablet Take 1 tablet (50 mg) by mouth at bedtime. 90 tablet 1       No Known Allergies     Social History     Tobacco Use    Smoking status: Never     Passive exposure: Never    Smokeless tobacco: Never   Substance Use Topics    Alcohol use: Yes       History   Drug Use Unknown      No family history of anesthesia problems no family history of bleeding disorders        Objective    /76   Pulse 74   Temp 97.3  F (36.3  C) (Tympanic)   Resp 18   Ht 1.702 m (5' 7\")   Wt 111.9 kg (246 lb 12.8 oz)   SpO2 97%   BMI 38.65 kg/m     Estimated body mass index is 38.65 kg/m  as calculated from the following:    Height as of this encounter: 1.702 m (5' 7\").    Weight as of this encounter: 111.9 kg (246 lb 12.8 oz).  Physical Exam  Vitals and nursing note reviewed.   Constitutional:       Appearance: Normal appearance.   HENT:      Head: Normocephalic and atraumatic.      Right Ear: Tympanic membrane normal.      Left Ear: Tympanic membrane normal.      Nose: Nose normal. No congestion or rhinorrhea.      Mouth/Throat:      Mouth: Mucous membranes are moist.   Eyes:      Conjunctiva/sclera: Conjunctivae normal.   Cardiovascular:      Rate and Rhythm: Normal rate and regular rhythm.      Heart sounds: Normal heart sounds.   Pulmonary:      Effort: Pulmonary effort is normal.      Breath sounds: Normal breath sounds.   Abdominal:      General: Abdomen is flat. Bowel sounds are normal.      Palpations: There is no mass.      Tenderness: There is no abdominal tenderness. "   Musculoskeletal:         General: Normal range of motion.      Cervical back: Normal range of motion and neck supple.      Right lower leg: No edema.      Left lower leg: No edema.   Lymphadenopathy:      Cervical: No cervical adenopathy.   Skin:     General: Skin is warm and dry.   Neurological:      General: No focal deficit present.   Psychiatric:         Mood and Affect: Mood normal.         Behavior: Behavior normal.         Thought Content: Thought content normal.         Judgment: Judgment normal.           Recent Labs   Lab Test 12/04/24  1605 05/08/24  1532   NA  --  138   POTASSIUM  --  4.8   CR  --  1.16   A1C 8.4* 8.0*        Diagnostics  Labs pending at this time.  Results will be reviewed when available.   No EKG this visit, completed in the last 90 days.  Recent normal echo    Revised Cardiac Risk Index (RCRI)  The patient has the following serious cardiovascular risks for perioperative complications:   - No serious cardiac risks = 0 points     RCRI Interpretation: 0 points: Class I (very low risk - 0.4% complication rate)         Signed Electronically by: KERLINE Land  A copy of this evaluation report is provided to the requesting physician.

## 2025-03-11 ENCOUNTER — TELEPHONE (OUTPATIENT)
Dept: CARDIOLOGY | Facility: CLINIC | Age: 60
End: 2025-03-11
Payer: COMMERCIAL

## 2025-03-11 NOTE — TELEPHONE ENCOUNTER
Pre-Procedure Education    Procedure: SVT Abaltion with Dr Mercado on 3/18 with arrival time 7:30 am    Orders: Orderset for procedure verified signed/held    COVID: COVID policy- if pt develops COVID like symptoms prior to procedure, he/she would need to complete an at home with a rapid antigen COVID test 1-2 days prior to your procedure date. If COVID + pt is aware the procedure will need to be rescheduled, and to contact CV scheduling as soon as possible    Pre-Op H&P: Completed- Available in Epic    Education:   Contact: Attempted to contact pt via phone, VM was left with request that pt return call to review above/below information  Pre-Procedure Instruction: NPO after midnight pre procedure, Defined NPO with pt, Remove all jewelry and leave all valuables at home, Shower prior to arrival, Sedation plan/orders, Transportation requirements and arrangements post procedure, Post-procedure follow up process, Post-procedure restrictions/expectations, and Pre-procedure letter sent- letter tab  Risks:  Cardiac Ablation  <1% Hypotension, Hemorrhage, Thrombophlebitis, Systemic or pulmonic emboli, Cardiac perforation (tamponade), Infection, Pneumothorax, Arrhythmias, Proarrhythmic effects of drugs, Radiation exposure, Catheter entrapment  <1 % Vascular injury including perforation of vein, artery or heart  1-2% Complete heart block (for AVNRT or septal accessory pathway)  <0.5% CVA or MI, 1% CVA if Left sided ablation  <0.1% death  If external defibrillation or CV is needed, 25% risk for superficial burn  1-2% Tamponade and Aortic puncture with left sided transeptal approach  Risks associated with general anesthesia will be addressed by the Anesthesiology Department      Medication:   Instructions regarding anticoagulants: Pt not on AC- N/A  Instructions regarding antiarrhythmic medication: Beta Blocker; hold 5 days prior to procedure  Instructions given to pt regarding diuretics medication: None  Instructions given to pt  regarding DM/GLP-1 medication:   DM- Hold all oral diabetic medications, short acting insulin the morning of the procedure, and take 1/2 of pts scheduled doses of long acting insulin the PM prior and/or AM of procedure  GLP-1- None  Instructions for medication, other than anticoagulants and antiarrhythmics listed above, given to pt: Take all medication AM of procedure with small sips of water     Important patient information for staff: None    3/11/2025 10:38 AM  Maranda Quigley RN

## 2025-03-13 NOTE — TELEPHONE ENCOUNTER
Pt was called, corresponding information/recommendations reviewed, verbalized understanding, has no further questions at this time, and contact information was given for further concerns/questions   3/13/2025 2:37 PM  Maranda Moody RN       Transition of Care Plan:    RUR: 7%  GLOS:     3   LOS:   1 day   Disposition:   Home with Home Health vs SNF vs IPR, CM Consulted for discharge planning  Follow up appointments: PCP & Specialist  DME needed:   Shaggy Stewart in the home  Transportation at Discharge:   Spouse   Keys or means to access home:      Spouse  IM Medicare Letter:    11/9 1st IM  Is patient a  and connected with the [place]? Yes  If yes, was [place] transfer form completed and [facility] notified? No  Caregiver Contact:  Spouse/Zoey Brenner  Discharge Caregiver contacted prior to discharge? Yes  Care Conference needed?:         Not indicated    Reason for Admission:  ground level fall and pain/inability to ambulate                   RUR Score:         7%            Plan for utilizing home health:      TBD    PCP: First and Last name:  Refugio Mckeon MD     Name of Practice:    Are you a current patient: Yes/No:    Approximate date of last visit:    Can you participate in a virtual visit with your PCP:                     Current Advanced Directive/Advance Care Plan: Full Code    Healthcare Decision Maker:              Primary Decision MakeMalorie [facility] [phone]                  Transition of Care Plan:                      Jakob Maki is a 67year old male to ED [facility] ED after ground level fall and unable to ambulate. Ortho consulted - CT of right hip confirmed right femoral neck fracture. Plan for OR for total hip arthroplasty. Cardiology Consulted. CM attempted to meet with patient, he is in pre-op holding. CM to follow up.     Living Situation:        Lives with spouse  ADLs:                          Independent prior to admission  DME:                           Rol[employer] Walker, motorized scooter  Prior Home Health:   None  Prior SNF/IPR:           None, Outpatient PT at [place]  Demographics:          Verified  Payor Source:           Medicare A& B and [facility], [address]:  [employer] [address] [facility]  [phone]/Available on Perfect [employer]

## 2025-03-18 ENCOUNTER — TELEPHONE (OUTPATIENT)
Dept: CARDIOLOGY | Facility: CLINIC | Age: 60
End: 2025-03-18

## 2025-03-18 ENCOUNTER — NURSE TRIAGE (OUTPATIENT)
Dept: CARDIOLOGY | Facility: CLINIC | Age: 60
End: 2025-03-18

## 2025-03-18 ENCOUNTER — HOSPITAL ENCOUNTER (OUTPATIENT)
Facility: HOSPITAL | Age: 60
Discharge: HOME | End: 2025-03-18
Attending: INTERNAL MEDICINE | Admitting: INTERNAL MEDICINE
Payer: COMMERCIAL

## 2025-03-18 ENCOUNTER — ANESTHESIA (OUTPATIENT)
Dept: CARDIOLOGY | Facility: HOSPITAL | Age: 60
End: 2025-03-18
Payer: COMMERCIAL

## 2025-03-18 ENCOUNTER — ANESTHESIA EVENT (OUTPATIENT)
Dept: CARDIOLOGY | Facility: HOSPITAL | Age: 60
End: 2025-03-18
Payer: COMMERCIAL

## 2025-03-18 VITALS
TEMPERATURE: 98.4 F | BODY MASS INDEX: 37.98 KG/M2 | DIASTOLIC BLOOD PRESSURE: 89 MMHG | OXYGEN SATURATION: 94 % | SYSTOLIC BLOOD PRESSURE: 143 MMHG | RESPIRATION RATE: 30 BRPM | HEIGHT: 67 IN | HEART RATE: 85 BPM | WEIGHT: 242 LBS

## 2025-03-18 DIAGNOSIS — I47.10 SVT (SUPRAVENTRICULAR TACHYCARDIA): ICD-10-CM

## 2025-03-18 LAB
ANION GAP SERPL CALCULATED.3IONS-SCNC: 4 MMOL/L (ref 7–15)
ATRIAL RATE - MUSE: 60 BPM
BUN SERPL-MCNC: 16.2 MG/DL (ref 8–23)
CALCIUM SERPL-MCNC: 9 MG/DL (ref 8.8–10.4)
CHLORIDE SERPL-SCNC: 105 MMOL/L (ref 98–107)
CREAT SERPL-MCNC: 0.93 MG/DL (ref 0.67–1.17)
DIASTOLIC BLOOD PRESSURE - MUSE: NORMAL MMHG
EGFRCR SERPLBLD CKD-EPI 2021: >90 ML/MIN/1.73M2
ERYTHROCYTE [DISTWIDTH] IN BLOOD BY AUTOMATED COUNT: 12.2 % (ref 10–15)
GLUCOSE SERPL-MCNC: 191 MG/DL (ref 70–99)
HCO3 SERPL-SCNC: 30 MMOL/L (ref 22–29)
HCT VFR BLD AUTO: 44.8 % (ref 40–53)
HGB BLD-MCNC: 15.8 G/DL (ref 13.3–17.7)
INTERPRETATION ECG - MUSE: NORMAL
MCH RBC QN AUTO: 31.2 PG (ref 26.5–33)
MCHC RBC AUTO-ENTMCNC: 35.3 G/DL (ref 31.5–36.5)
MCV RBC AUTO: 88 FL (ref 78–100)
P AXIS - MUSE: 58 DEGREES
PLATELET # BLD AUTO: 305 10E3/UL (ref 150–450)
POTASSIUM SERPL-SCNC: 3.9 MMOL/L (ref 3.4–5.3)
PR INTERVAL - MUSE: 180 MS
QRS DURATION - MUSE: 104 MS
QT - MUSE: 432 MS
QTC - MUSE: 432 MS
R AXIS - MUSE: 64 DEGREES
RBC # BLD AUTO: 5.07 10E6/UL (ref 4.4–5.9)
SODIUM SERPL-SCNC: 139 MMOL/L (ref 135–145)
SYSTOLIC BLOOD PRESSURE - MUSE: NORMAL MMHG
T AXIS - MUSE: 69 DEGREES
VENTRICULAR RATE- MUSE: 60 BPM
WBC # BLD AUTO: 7.9 10E3/UL (ref 4–11)

## 2025-03-18 PROCEDURE — C1732 CATH, EP, DIAG/ABL, 3D/VECT: HCPCS | Performed by: INTERNAL MEDICINE

## 2025-03-18 PROCEDURE — C1733 CATH, EP, OTHR THAN COOL-TIP: HCPCS | Performed by: INTERNAL MEDICINE

## 2025-03-18 PROCEDURE — 250N000013 HC RX MED GY IP 250 OP 250 PS 637: Performed by: INTERNAL MEDICINE

## 2025-03-18 PROCEDURE — 93623 PRGRMD STIMJ&PACG IV RX NFS: CPT | Performed by: INTERNAL MEDICINE

## 2025-03-18 PROCEDURE — 370N000017 HC ANESTHESIA TECHNICAL FEE, PER MIN: Performed by: INTERNAL MEDICINE

## 2025-03-18 PROCEDURE — C1730 CATH, EP, 19 OR FEW ELECT: HCPCS | Performed by: INTERNAL MEDICINE

## 2025-03-18 PROCEDURE — 272N000001 HC OR GENERAL SUPPLY STERILE: Performed by: INTERNAL MEDICINE

## 2025-03-18 PROCEDURE — 93653 COMPRE EP EVAL TX SVT: CPT | Performed by: INTERNAL MEDICINE

## 2025-03-18 PROCEDURE — 93005 ELECTROCARDIOGRAM TRACING: CPT

## 2025-03-18 PROCEDURE — 85018 HEMOGLOBIN: CPT | Performed by: INTERNAL MEDICINE

## 2025-03-18 PROCEDURE — 82565 ASSAY OF CREATININE: CPT | Performed by: INTERNAL MEDICINE

## 2025-03-18 PROCEDURE — 36415 COLL VENOUS BLD VENIPUNCTURE: CPT | Performed by: INTERNAL MEDICINE

## 2025-03-18 PROCEDURE — 93010 ELECTROCARDIOGRAM REPORT: CPT | Performed by: STUDENT IN AN ORGANIZED HEALTH CARE EDUCATION/TRAINING PROGRAM

## 2025-03-18 PROCEDURE — 82435 ASSAY OF BLOOD CHLORIDE: CPT | Performed by: INTERNAL MEDICINE

## 2025-03-18 PROCEDURE — 250N000011 HC RX IP 250 OP 636: Performed by: INTERNAL MEDICINE

## 2025-03-18 PROCEDURE — 85048 AUTOMATED LEUKOCYTE COUNT: CPT | Performed by: INTERNAL MEDICINE

## 2025-03-18 PROCEDURE — 710N000010 HC RECOVERY PHASE 1, LEVEL 2, PER MIN

## 2025-03-18 PROCEDURE — 93623 PRGRMD STIMJ&PACG IV RX NFS: CPT | Mod: 26 | Performed by: INTERNAL MEDICINE

## 2025-03-18 PROCEDURE — 250N000009 HC RX 250: Performed by: INTERNAL MEDICINE

## 2025-03-18 PROCEDURE — 250N000011 HC RX IP 250 OP 636: Performed by: STUDENT IN AN ORGANIZED HEALTH CARE EDUCATION/TRAINING PROGRAM

## 2025-03-18 PROCEDURE — 80048 BASIC METABOLIC PNL TOTAL CA: CPT | Performed by: INTERNAL MEDICINE

## 2025-03-18 PROCEDURE — C1887 CATHETER, GUIDING: HCPCS | Performed by: INTERNAL MEDICINE

## 2025-03-18 PROCEDURE — 999N000054 HC STATISTIC EKG NON-CHARGEABLE

## 2025-03-18 PROCEDURE — 258N000003 HC RX IP 258 OP 636: Performed by: STUDENT IN AN ORGANIZED HEALTH CARE EDUCATION/TRAINING PROGRAM

## 2025-03-18 RX ORDER — BUPIVACAINE HYDROCHLORIDE 5 MG/ML
INJECTION, SOLUTION PERINEURAL
Status: DISCONTINUED | OUTPATIENT
Start: 2025-03-18 | End: 2025-03-18 | Stop reason: HOSPADM

## 2025-03-18 RX ORDER — LIDOCAINE 40 MG/G
CREAM TOPICAL
Status: DISCONTINUED | OUTPATIENT
Start: 2025-03-18 | End: 2025-03-18 | Stop reason: HOSPADM

## 2025-03-18 RX ORDER — IBUPROFEN 600 MG/1
600 TABLET, FILM COATED ORAL EVERY 6 HOURS PRN
Status: DISCONTINUED | OUTPATIENT
Start: 2025-03-18 | End: 2025-03-18 | Stop reason: HOSPADM

## 2025-03-18 RX ORDER — SODIUM CHLORIDE 9 MG/ML
INJECTION, SOLUTION INTRAVENOUS CONTINUOUS PRN
Status: DISCONTINUED | OUTPATIENT
Start: 2025-03-18 | End: 2025-03-18

## 2025-03-18 RX ORDER — ONDANSETRON 2 MG/ML
4 INJECTION INTRAMUSCULAR; INTRAVENOUS EVERY 6 HOURS PRN
Status: DISCONTINUED | OUTPATIENT
Start: 2025-03-18 | End: 2025-03-18 | Stop reason: HOSPADM

## 2025-03-18 RX ORDER — PROPOFOL 10 MG/ML
INJECTION, EMULSION INTRAVENOUS PRN
Status: DISCONTINUED | OUTPATIENT
Start: 2025-03-18 | End: 2025-03-18

## 2025-03-18 RX ORDER — SODIUM CHLORIDE 9 MG/ML
100 INJECTION, SOLUTION INTRAVENOUS CONTINUOUS
Status: DISCONTINUED | OUTPATIENT
Start: 2025-03-18 | End: 2025-03-18 | Stop reason: HOSPADM

## 2025-03-18 RX ORDER — PROPOFOL 10 MG/ML
INJECTION, EMULSION INTRAVENOUS CONTINUOUS PRN
Status: DISCONTINUED | OUTPATIENT
Start: 2025-03-18 | End: 2025-03-18

## 2025-03-18 RX ORDER — LIDOCAINE HYDROCHLORIDE AND EPINEPHRINE 10; 10 MG/ML; UG/ML
INJECTION, SOLUTION INFILTRATION; PERINEURAL
Status: DISCONTINUED | OUTPATIENT
Start: 2025-03-18 | End: 2025-03-18 | Stop reason: HOSPADM

## 2025-03-18 RX ORDER — ONDANSETRON 4 MG/1
4 TABLET, ORALLY DISINTEGRATING ORAL EVERY 6 HOURS PRN
Status: DISCONTINUED | OUTPATIENT
Start: 2025-03-18 | End: 2025-03-18 | Stop reason: HOSPADM

## 2025-03-18 RX ORDER — ACETAMINOPHEN 325 MG/1
650 TABLET ORAL EVERY 4 HOURS PRN
Status: DISCONTINUED | OUTPATIENT
Start: 2025-03-18 | End: 2025-03-18 | Stop reason: HOSPADM

## 2025-03-18 RX ADMIN — PROPOFOL 50 MG: 10 INJECTION, EMULSION INTRAVENOUS at 10:22

## 2025-03-18 RX ADMIN — SODIUM CHLORIDE: 9 INJECTION, SOLUTION INTRAVENOUS at 10:14

## 2025-03-18 RX ADMIN — PROPOFOL 125 MCG/KG/MIN: 10 INJECTION, EMULSION INTRAVENOUS at 10:22

## 2025-03-18 RX ADMIN — ACETAMINOPHEN 650 MG: 325 TABLET ORAL at 14:23

## 2025-03-18 ASSESSMENT — ACTIVITIES OF DAILY LIVING (ADL)
ADLS_ACUITY_SCORE: 41

## 2025-03-18 NOTE — ANESTHESIA PREPROCEDURE EVALUATION
"Anesthesia Pre-Procedure Evaluation    Patient: Vince Martinez   MRN: [id number] : 1965        Procedure : Procedure(s):  Ablation Supraventricular Tachycardia          No past medical history on file.   No past surgical history on file.   No Known Allergies   Social History     Tobacco Use    Smoking status: Never     Passive exposure: Never    Smokeless tobacco: Never   Substance Use Topics    Alcohol use: Yes      Wt Readings from Last 1 Encounters:   25 111.9 kg (246 lb 12.8 oz)        Anesthesia Evaluation            ROS/MED HX  ENT/Pulmonary:     (+) sleep apnea,                                       Neurologic:       Cardiovascular:       METS/Exercise Tolerance:     Hematologic:       Musculoskeletal:       GI/Hepatic:       Renal/Genitourinary:       Endo:     (+) type I DM,         thyroid problem,     Obesity,       Psychiatric/Substance Use:       Infectious Disease:       Malignancy:       Other:            Physical Exam    Airway        Mallampati: III   TM distance: > 3 FB   Neck ROM: full   Mouth opening: > 3 cm    Respiratory Devices and Support         Dental    unable to assess        Cardiovascular   cardiovascular exam normal          Pulmonary   pulmonary exam normal                OUTSIDE LABS:  CBC:   Lab Results   Component Value Date    WBC 12.2 (H) 2025    HGB 16.4 2025    HCT 47.2 2025     2025     BMP:   Lab Results   Component Value Date     2025     2024    POTASSIUM 4.1 2025    POTASSIUM 4.8 2024    CHLORIDE 104 2025    CHLORIDE 102 2024    CO2 25 2025    CO2 26 2024    BUN 15.1 2025    BUN 18.3 2024    CR 0.96 2025    CR 1.16 2024    GLC 95 2025     (H) 2024     COAGS: No results found for: \"PTT\", \"INR\", \"FIBR\"  POC: No results found for: \"BGM\", \"HCG\", \"HCGS\"  HEPATIC: No results found for: \"ALBUMIN\", \"PROTTOTAL\", \"ALT\", \"AST\", \"GGT\", " "\"ALKPHOS\", \"BILITOTAL\", \"BILIDIRECT\", \"CONNIE\"  OTHER:   Lab Results   Component Value Date    A1C 8.4 (H) 12/04/2024    WILLAM 9.6 02/18/2025    TSH 57.00 (H) 05/08/2024    T4 0.55 (L) 05/08/2024       Anesthesia Plan    ASA Status:  3       Anesthesia Type: MAC.   Induction: Intravenous.   Maintenance: TIVA.        Consents    Anesthesia Plan(s) and associated risks, benefits, and realistic alternatives discussed. Questions answered and patient/representative(s) expressed understanding.     - Discussed: Risks, Benefits and Alternatives for BOTH SEDATION and the PROCEDURE were discussed     - Discussed with:       - Extended Intubation/Ventilatory Support Discussed: No.      - Patient is DNR/DNI Status: No     Use of blood products discussed: No .     Postoperative Care    Pain management: IV analgesics.        Comments:               Asher Arias MD    I have reviewed the pertinent notes and labs in the chart from the past 30 days and (re)examined the patient.  Any updates or changes from those notes are reflected in this note.    Clinically Significant Risk Factors Present on Admission                            # DMII: A1C = N/A within past 6 months    # Obesity: Estimated body mass index is 38.65 kg/m  as calculated from the following:    Height as of 2/18/25: 1.702 m (5' 7\").    Weight as of 2/18/25: 111.9 kg (246 lb 12.8 oz).                "

## 2025-03-18 NOTE — TELEPHONE ENCOUNTER
"Received a call from [facility].    Patient had an ablation done today. Patient had Tylenol after procedure (per Epic patient received 650mg).   Spoke with patient's daughter Brissa who states patient forgot and took 1,000mg more of Tylenol when he got home. She wants to make sure this was ok.  Advised patient not have more than 4,000mg of Tylenol in 24 hours and to wait another 4-6 hours for him to receive another dose if needed. Patient currently has no pain/discomfort. He took the Tylenol for a headache.   Advised to continue monitoring patient for signs of nausea, vomiting, hives, rash, throat swelling, or shortness of breath and to go to the ED if emergent symptoms arise. She verbalized understanding.    1. REASON FOR CALL or QUESTION: \"What is your reason for calling today?\" or \"How can I best help you?\" or \"What question do you have that I can help answer?\"  "

## 2025-03-18 NOTE — ANESTHESIA POSTPROCEDURE EVALUATION
Patient: Vince Martinez    Procedure: Procedure(s):  Ablation Supraventricular Tachycardia       Anesthesia Type:  General    Note:  Disposition: Outpatient   Postop Pain Control: Uneventful            Sign Out: Well controlled pain   PONV: No   Neuro/Psych: Uneventful            Sign Out: Acceptable/Baseline neuro status   Airway/Respiratory: Uneventful            Sign Out: Acceptable/Baseline resp. status   CV/Hemodynamics: Uneventful            Sign Out: Acceptable CV status; No obvious hypovolemia; No obvious fluid overload   Other NRE: NONE   DID A NON-ROUTINE EVENT OCCUR? No           Last vitals:  Vitals Value Taken Time   /79 03/18/25 1245   Temp     Pulse 78 03/18/25 1247   Resp 35 03/18/25 1247   SpO2 95 % 03/18/25 1247   Vitals shown include unfiled device data.    Electronically Signed By: Asher Arias MD  March 18, 2025  12:48 PM

## 2025-03-18 NOTE — PROGRESS NOTES
Patient is kept comfortable during post-procedure stay. VSS. Denies pain. Right femoral access site remains dry & free from signs of bleeding. Tolerated food and fluids. Ambulated without issues. . Dr. Mercado was able to speak with patient post procedure. Post-op instructions reviewed and packet given to patient & spouse. Able to ask questions. Verbalized no concerns. Belongings returned. Discharged/ in stable condition.

## 2025-03-18 NOTE — INTERVAL H&P NOTE
"I have reviewed the surgical (or preoperative) H&P that is linked to this encounter, and examined the patient. There are no significant changes    Clinical Conditions Present on Arrival:  Clinically Significant Risk Factors Present on Admission                       # DMII: A1C = N/A within past 6 months  # Obesity: Estimated body mass index is 38.65 kg/m  as calculated from the following:    Height as of 2/18/25: 1.702 m (5' 7\").    Weight as of 2/18/25: 111.9 kg (246 lb 12.8 oz).       "

## 2025-03-18 NOTE — PROGRESS NOTES
Patient prepped and consented for SVT ablation today with Dr Mercado. Pt denies pain, VSS and able to make needs known. Awaiting pickup from cath lab.  Coreen Florentino RN

## 2025-03-18 NOTE — Clinical Note
[employer] system 12 lead EKG, hemodynamics 5 lead, pulse oximetery, NIBP, [employer] hands off defibrillator/external pacer, with 3 monitoring leads to patient. Baseline assessment done.

## 2025-03-18 NOTE — TELEPHONE ENCOUNTER
Caller reporting the following red-flag symptom(s): Medication and procedure     Per the system red-flag symptom policy, patient was instructed to:  speak with a Registered Nurse    Action:  Patient warm transferred to a Registered Nurse

## 2025-03-18 NOTE — ANESTHESIA CARE TRANSFER NOTE
Patient: Vince Martinez    Procedure: Procedure(s):  Ablation Supraventricular Tachycardia       Diagnosis: Supraventricular tachycardia  Diagnosis Additional Information: No value filed.    Anesthesia Type:   General     Note:    Oropharynx: oropharynx clear of all foreign objects and spontaneously breathing  Level of Consciousness: awake  Oxygen Supplementation: face mask  Level of Supplemental Oxygen (L/min / FiO2): 6  Independent Airway: airway patency satisfactory and stable  Dentition: dentition unchanged  Vital Signs Stable: post-procedure vital signs reviewed and stable  Report to RN Given: handoff report given  Patient transferred to: [facility]          Vitals:  Vitals Value Taken Time   /89 1059   3/18/25   Temp 97.0 1059   3/18/25      Pulse 77 1059   3/18/25      Resp 14 1059   3/18/25      SpO2 98 1059   3/18/25          Electronically Signed By: ELANA Russo CRNA  March 18, 2025  10:59 AM

## 2025-03-18 NOTE — DISCHARGE INSTRUCTIONS
Information on Supraventricular Tachycardia Ablations    Your Heart s Electrical System   Electrical signals activate the heart muscle which pumps the heart. The heart has a special system that creates and sends electrical signals. The 2 upper chambers (atria) are the first to be activated and squeeze the blood to the 2 lower chambers (ventricles). The electrical signal passes through the connector in the middle of the heart, activating the 2 lower chambers (ventricles), which then pump blood to the lungs and body.                                 Understanding Supraventricular Tachycardia  Supraventricular tachycardia (SVT) is an abnormally rapid heart rhythm from the upper chambers of the heart, the atria.  This rhythm can be quite fast but is not life-threatening.  It may cause feelings of rapid pounding in the chest, lightheadedness, chest discomfort, shortness of breath and rarely fainting.  Episodes vary in frequency and duration and sometimes require a visit to the Emergency Department for medication to stop the rapid beating.    Treatment of Supraventricular Tachycardia  The main goal of treatment of SVT is relief of symptoms by preventing or minimizing episodes of rapid heat beating.  Medications such as beta blockers may be effective but often need to be taken on a regular basis indefinitely and may be associated with side effects such as fatigue.  A catheter ablation is a procedure that permanently affects the extra pathways responsible for the abnormal circuits causing rapid heart beating.  The ablation procedure typically lasts about 3 hours done on an outpatient basis with over 98% likelihood of never having the rapid heart beating again or having to take medicines for rapid heart beating.          Supraventricular Tachycardia that may be treated with catheter ablation:                                                                                                        What is Catheter Ablation?  A  Catheter Ablation is a procedure that treats certain types of abnormal heart rhythms (arrhythmia). There are several components to the procedure, but the final purpose is target and destroy (ablate) small areas of your heart muscle that are causing the arrhythmia.     Why is an Ablations Important?  A catheter ablation is an effective way to treat some types of abnormal heart rhythms. An ablation is a relatively low risk procedure that may permanently cure your abnormal heart rhythm. An ablation can help avoid lifetime of medications and give patients the ability to return back to their normal activity and live an active life.    Why is Catheter Ablation Done?  Sometimes, the heart s electrical system does not work properly.  This can cause abnormal heart rhythms, called arrhythmias.  During an arrhythmia, the heart may beat too fast, too slowly, or irregularly.  Your doctor has recommended catheter ablation to treat a rapid (fast) heart rhythm, or tachycardia.    How Catheter Ablation Is Done  Catheter ablation uses thin, flexible wires called electrode catheters to find and destroy (ablate) problem cells. Here s how the procedure is done:    The catheters allow an ECG to be obtained from the inside of the heart so the type of abnormal pathways can be identified. The focus of your ablation is the left and/or right atrium of your heart. An ablation catheter is then moved into this area of the abnormal pathways to freeze or heat (ablate) the muscle fibers causing the abnormal heart rhythm.    If AV luiz reentry is found (Figure 1) a freezing catheter is used at the X. Freezing can be stopped if the catheter is too close to the normal connection (the AV node) without permanent damage.  If AV reentry is found (Figure 2) a heating catheter crosses the septum (S) to burn the accessory pathway (AP).  Blood thinners are used to prevent blood clots and stroke with the catheter on this side of the heart.  About 20 minutes  after the ablation, attempts are made to restart the rapid heart beating with pacing.  More ablation is done if SVT can still be started.  If SVT cannot be started the procedure is finished and there is less than 2% chance it will ever recur.    Your Experience during Catheter Ablation  In most cases, catheter ablations are done in an electrophysiology (EP) lab, and the time it takes to complete your ablation is dependent on the type of your arrythmia.   The procedural area: You will be transferred back to the procedure room once you have been appropriately prepped by the nursing staff and you are ready for your ablation.   Sedation: You ll receive medication to prevent pain and also a medication to help you relax or sedate you during the procedure.   Inserting the catheters: Catheters are inserted through small incision made in your groins site(s) and guided to the heart with the help of x-ray monitors.  Finishing up: When the procedure is finished, the catheters are taken out of your body. Pressure is applied to the puncture sites to help them close. You re then taken to a recovery room to rest  Risks and Complications  The risks of catheter ablation are fairly low compared to the benefits you receive. Possible risks and complications include:  Bleeding or bruising  Blood clots  A slow heart rhythm (requiring a permanent pacemaker)  Perforation of the heart muscle, blood vessel, or lung (may require an emergency procedure)  Damage to a heart valve (rare)  Stroke or heart attack, also known as acute myocardial infarction, or AMI (rare)  Death (extremely rare)  Before your Catheter Ablation  Before your catheter ablation, you will meet with the Electrophysiologist (specially trained heart doctor) who will do the procedure. The provider or a registered nurse will provide you with detailed instructions on how to prepare for this procedure, some of these instructions are listed below.  You will likely be told to stop  or change your heart rhythm medications for a period of time before your ablation.   You may have testing done several days prior to your ablation or the morning of your ablation, such as an ECG, x-ray, blood tests, or echocardiogram.   You will not be allowed to eat or drink 8 hours before your ablation. You will be given further instructions by your physician or a registered nurse regarding the medication you will take the morning of your procedure.  You will need to arrange to have a  home from the hospital; you will not be permitted to drive after your procedure due to the sedation that you receive.   You are allowed to bring personal items and clothing to the hospital, but please refrain from bringing any valuables as the hospital is not responsible for any lost or stolen items.  You will need to bring a list of the names and dosages of the medication you are taking to the hospital.  It is important to mention to your doctor or registered nurse if you have any allergies, reactions to anesthesia, or have had history of bleeding problems.    Preparing for your Procedure  A nurse will shave and cleanse the area where the ablation catheters will be placed. These areas are most commonly the left and right groin sites (the fold between your thigh and abdomen), and in some cases the chest, arm, and neck. This is done to reduce the risk of infection.    The nursing staff will start an intravenous (IV) line into a vein in your arm, which allows the staff to give you medication and sedatives to help you relax prior and during your ablation.  In some cases the nursing staff will need to place a catheter that will drain urine from your bladder (De Jesus Catheter), which is required due to the length and complexity of the ablation you are having.    After Catheter Ablation  Recovery immediately after your ablation in the hospital  After your catheter ablation procedure, you ll be taken to a recovery room. You may need to  lie flat for several hours while the insertion sites close up. During this time you ll be monitored by a nurse, and you will be given medication to make you comfortable. Most patients after this type of procedure will be allowed to go home the same day.   Going Home  When it s time to go home, your will need to have an adult family member or friend drive you. Most people can walk, climb stairs, and perform light activity soon after catheter ablation. You can most likely return to your full routine within a few days. But you may be told to avoid running, heavy lifting, and other strenuous activities for a short time. Please make sure to follow any specific activity restrictions provided by the medical staff at the time of your discharge from the hospital.  Doctor's typically advise that you not drive for 24 hours after the procedure.   Avoid heavy physical activity and heavy lifting for several days after the procedure to allow your body to heal.  Ask your doctor when you can expect to return to work.  Take your temperature and check your incision for signs of infection (redness, swelling, drainage, or warmth) every day for a week. It is normal to have a small bruise or lump where the catheter was inserted.  Take your medications exactly as directed. Don t skip doses or stop medication without consulting your physician prior.  Learn to take your own pulse and keep a record of your results.    Follow-Up  After most ablations you will have a follow up visit to see how you are doing, to assess your rhythm after your ablation, and to address any medication changes if necessary. In many cases, one ablation is enough to treat an arrhythmia. But sometimes the problem returns or another is found. If this happens, you may need a second catheter ablation. Tell your healthcare provider if you have any new or returning symptoms.  Common Symptoms after Catheter Ablation  In the first few weeks after catheter ablation, you may  feel mild chest fullness or aching. You may also feel as if your heart is skipping beats or your heartbeat may feel faster than normal. You may think that your heart rhythm problem is about to return. These sensations are normal and usually go away with time. Talk to your healthcare provider if you re concerned.    When to Call Your Doctor  Increased bleeding, bruising, or pain at the insertion site  Shortness of breath or chest pain  Difficulty with your speech or walking, or any visual disturbance  Lightheaded, dizziness, or feeling faint  Coldness, swelling, or numbness of the arm or leg near the insertion site  A bruise or lump at the insertion site that is larger than a walnut  A fever over 100 F  Symptoms of your arrhythmia

## 2025-03-23 ENCOUNTER — HEALTH MAINTENANCE LETTER (OUTPATIENT)
Age: 60
End: 2025-03-23

## 2025-04-08 ENCOUNTER — PATIENT OUTREACH (OUTPATIENT)
Dept: CARE COORDINATION | Facility: CLINIC | Age: 60
End: 2025-04-08
Payer: COMMERCIAL

## 2025-04-10 DIAGNOSIS — E11.43 TYPE 2 DIABETES MELLITUS WITH DIABETIC AUTONOMIC NEUROPATHY, WITHOUT LONG-TERM CURRENT USE OF INSULIN (H): ICD-10-CM

## 2025-04-10 RX ORDER — EMPAGLIFLOZIN 10 MG/1
10 TABLET, FILM COATED ORAL DAILY
Qty: 90 TABLET | Refills: 1 | Status: SHIPPED | OUTPATIENT
Start: 2025-04-10

## 2025-04-19 DIAGNOSIS — E11.43 TYPE 2 DIABETES MELLITUS WITH DIABETIC AUTONOMIC NEUROPATHY, WITHOUT LONG-TERM CURRENT USE OF INSULIN (H): ICD-10-CM

## 2025-04-21 RX ORDER — METFORMIN HYDROCHLORIDE 500 MG/1
2000 TABLET, EXTENDED RELEASE ORAL
Qty: 360 TABLET | Refills: 0 | Status: SHIPPED | OUTPATIENT
Start: 2025-04-21

## 2025-04-22 ENCOUNTER — PATIENT OUTREACH (OUTPATIENT)
Dept: CARE COORDINATION | Facility: CLINIC | Age: 60
End: 2025-04-22
Payer: COMMERCIAL

## 2025-04-30 ENCOUNTER — OFFICE VISIT (OUTPATIENT)
Dept: CARDIOLOGY | Facility: CLINIC | Age: 60
End: 2025-04-30
Payer: COMMERCIAL

## 2025-04-30 VITALS
SYSTOLIC BLOOD PRESSURE: 117 MMHG | RESPIRATION RATE: 14 BRPM | WEIGHT: 232 LBS | HEART RATE: 80 BPM | DIASTOLIC BLOOD PRESSURE: 77 MMHG | BODY MASS INDEX: 36.34 KG/M2

## 2025-04-30 DIAGNOSIS — I47.10 SVT (SUPRAVENTRICULAR TACHYCARDIA): ICD-10-CM

## 2025-04-30 PROCEDURE — 99213 OFFICE O/P EST LOW 20 MIN: CPT

## 2025-04-30 PROCEDURE — 3074F SYST BP LT 130 MM HG: CPT

## 2025-04-30 PROCEDURE — 3078F DIAST BP <80 MM HG: CPT

## 2025-04-30 NOTE — LETTER
4/30/2025    KERLINE Land  [address] [id number]    RE: Vince Martinez       Dear Colleague,     I had the pleasure of seeing Vince Martinez in the [facility].       [facility]  Cardiac Electrophysiology  [address]  [place] [address]   Office: [phone]  Fax: [phone]     HEART CARE ELECTROPHYSIOLOGY FOLLOW UP    Primary Care: Jayant Dalton MD    Assessment/Recommendations     SVT: Status post AVNRT ablation via slow pathway with Dr. Mercado. No symptomatology suggesting recurrent arrhythmia. He has had no complications subsequent to catheter ablation procedure. Had previously been managed on metoprolol 50 mg BID, this was stopped prior to ablation and has not been resumed.     Hypertension: Controlled, follows with general cardiology    Follow up: general cardiology 6 months       History of Present Illness/Subjective    Vince Martinez is a 59 year old male who is seen today for follow up status post AVNRT ablation with Dr. Mercado 3/18/25. He has a past medical history significant for SVT and HTN. He follows with Dr. Deluna for general cardiology.     Previously reproted SVT epsidoes multiple times per week.  He has had no recurrences since.  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 He monitors with a smart watch consistently.    He reports an uneventful recovery from ablation. He denies groin site issues, heartburn, difficulty swallowing, or neurologic changes. He denies chest discomfort, palpitations, abdominal fullness/bloating or peripheral edema, shortness of breath, paroxysmal nocturnal dyspnea, orthopnea, lightheadedness, dizziness, pre-syncope, or syncope.     Data Review     Mr. Martinez's SVT history is as summarized below:  Symptoms: palpitations  Symptom onset date: ~2018  Diagnosis date: ~2018  Admissions/ER visits: 11/20/2024 (ER - adenosine)  Prior medical  therapies: metoprolol ~2018  Prior adenosine/DCCVs: ~2018 (adenosine), 11/20/2024 (adenosine 6mg IV)  Prior ablations: none    ECGs (tracings independently reviewed)  11/20/2024 - SR 92bpm, IL 168ms  11/20/2024 - regular short RP NCT 147bpm     1/14/2025 TTE  1. Left ventricular chamber size, wall thickness and systolic function are  normal. The visually estimated left ventricular ejection fraction is 60-65%.  2. Right ventricular chamber size and systolic function are normal.  3. No hemodynamically significant valvular abnormalities.  4. Non-aneurysmal enlargement of the aortic root measuring 4.1 cm.  5. No prior study available for comparison.     I have reviewed and updated the patient's past medical history, allergy list and medication list.                Physical Examination Review of Systems   BMI= There is no height or weight on file to calculate BMI.    Wt Readings from Last 3 Encounters:   03/18/25 109.8 kg (242 lb)   02/18/25 111.9 kg (246 lb 12.8 oz)   01/30/25 109.1 kg (240 lb 8 oz)       Vitals: There were no vitals taken for this visit.  General   Appearance:   Alert and oriented, in no acute distress.    HEENT:  Normocephalic and atraumatic. Conjunctiva and sclera are clear. Moist oral mucosa.    Neck: No JVP, carotid bruit or obvious thyromegaly.   Lungs:   Respirations unlabored. Clear bilaterally with no rales, rhonchi, or wheezes.     Cardiovascular:   Rhythm is regular. S1 and S2 are normal. No significant murmur is present. Lower extremities demonstrate no significant edema. Posterior tibial pulses are intact bilaterally.   Extremities: No cyanosis or clubbing   Skin: Skin is warm, dry, and otherwise intact.   Neurologic: Gait not assessed. Mood and affect appropriate.     ROS: 10 point ROS neg other than the symptoms noted above in the HPI.        Medical History  Surgical History Family History Social History   No past medical history on file. Past Surgical History:   Procedure Laterality  Date     EP ABLATION SVT N/A 3/18/2025    Procedure: Ablation Supraventricular Tachycardia;  Surgeon: Stephanie Mercado MD;  Location: [facility]    No family history on file. Social History     Socioeconomic History     Marital status:      Spouse name: Not on file     Number of children: Not on file     Years of education: Not on file     Highest education level: Not on file   Occupational History     Not on file   Tobacco Use     Smoking status: Never     Passive exposure: Never     Smokeless tobacco: Never   Vaping Use     Vaping status: Never Used   Substance and Sexual Activity     Alcohol use: Yes     Drug use: Never     Sexual activity: Not on file   Other Topics Concern     Not on file   Social History Narrative     Not on file     Social Drivers of Health     Financial Resource Strain: Low Risk  (5/8/2024)    Financial Resource Strain      Within the past 12 months, have you or your family members you live with been unable to get utilities (heat, electricity) when it was really needed?: No   Food Insecurity: Low Risk  (5/8/2024)    Food Insecurity      Within the past 12 months, did you worry that your food would run out before you got money to buy more?: No      Within the past 12 months, did the food you bought just not last and you didn t have money to get more?: No   Transportation Needs: Low Risk  (5/8/2024)    Transportation Needs      Within the past 12 months, has lack of transportation kept you from medical appointments, getting your medicines, non-medical meetings or appointments, work, or from getting things that you need?: No   Physical Activity: Unknown (5/8/2024)    Exercise Vital Sign      Days of Exercise per Week: 0 days      Minutes of Exercise per Session: Not on file   Stress: No Stress Concern Present (5/8/2024)    [employer] - Occupational Stress Questionnaire      Feeling of Stress : Only a little   Social Connections: Unknown  (5/8/2024)    Social Connection and Isolation Panel [NHANES]      Frequency of Communication with Friends and Family: Not on file      Frequency of Social Gatherings with Friends and Family: Once a week      Attends Yazidism Services: Not on file      Active Member of Clubs or Organizations: Not on file      Attends Club or Organization Meetings: Not on file      Marital Status: Not on file   Interpersonal Safety: Low Risk  (3/18/2025)    Interpersonal Safety      Do you feel physically and emotionally safe where you currently live?: Yes      Within the past 12 months, have you been hit, slapped, kicked or otherwise physically hurt by someone?: No      Within the past 12 months, have you been humiliated or emotionally abused in other ways by your partner or ex-partner?: No   Housing Stability: Low Risk  (5/8/2024)    Housing Stability      Do you have housing? : Yes      Are you worried about losing your housing?: No          Medications  Allergies   Scheduled Meds:  Current Outpatient Medications   Medication Sig Dispense Refill     atorvastatin (LIPITOR) 40 MG tablet Take 1 tablet (40 mg) by mouth daily. 90 tablet 3     glipiZIDE (GLUCOTROL XL) 10 MG 24 hr tablet Take 2 tablets (20 mg) by mouth daily. 180 tablet 1     JARDIANCE 10 MG TABS tablet TAKE 1 TABLET (10 MG) BY MOUTH DAILY. 90 tablet 1     Lancets (ONETOUCH DELICA PLUS OCFGTL38B) MISC PLEASE SEE ATTACHED FOR DETAILED DIRECTIONS       levothyroxine (SYNTHROID/LEVOTHROID) 200 MCG tablet TAKE 1 TABLET BY MOUTH EVERY DAY 30 tablet 0     metFORMIN (GLUCOPHAGE XR) 500 MG 24 hr tablet TAKE 4 TABLETS (2,000 MG) BY MOUTH DAILY (WITH BREAKFAST) 360 tablet 0     ONETOUCH VERIO IQ test strip 1 strip by In Vitro route 3 times daily       SUMAtriptan (IMITREX) 50 MG tablet Take 1 tablet (50 mg) by mouth at onset of headache for migraine 9 tablet 11     traZODone (DESYREL) 50 MG tablet Take 1 tablet (50 mg) by mouth at bedtime. 90 tablet 1    No Known Allergies       Lab Results    Chemistry/lipid CBC Cardiac Enzymes/BNP/TSH/INR   Lab Results   Component Value Date    CHOL 312 (H) 2024    HDL 55 2024    TRIG 478 (H) 2024    BUN 16.2 2025     2025    CO2 30 (H) 2025    Lab Results   Component Value Date    WBC 7.9 2025    HGB 15.8 2025    HCT 44.8 2025    MCV 88 2025     2025    Last Comprehensive Metabolic Panel:  Lab Results   Component Value Date     2025    POTASSIUM 3.9 2025    CHLORIDE 105 2025    CO2 30 (H) 2025    ANIONGAP 4 (L) 2025     (H) 2025    BUN 16.2 2025    CR 0.93 2025    GFRESTIMATED >90 2025    WILLAM 9.0 2025             This note has been dictated using voice recognition software. Any grammatical, typographical, or context distortions are unintentional and inherent to the software.    Kurtis Miles PA-C  Clinical Cardiac Electrophysiology  [facility]  Clinic and [phone]  Fax: [phone]  Electrophysiology Nurses: [phone]       Thank you for allowing me to participate in the care of your patient.      Sincerely,     Noble Miles PA-C     [facility]  cc:   Noble Miles PA-C  [address]  [place] [address]

## 2025-04-30 NOTE — PATIENT INSTRUCTIONS
Vince Martinez,    It was a pleasure to see you today at the [facility].     My recommendations after this visit include:  - no changes from my end, continue to monitor with smart watch  - general cardiology follow up 6 months, routine follow up from there. No scheduled EP follow up needed    Please do not hesitate to call with additional questions or concerns.     Kurtis Miles PA-C  Clinical Cardiac Electrophysiology  [facility]  Clinic and [phone]  Fax: [phone]  Electrophysiology Nurses: [phone]

## 2025-05-03 DIAGNOSIS — E11.43 TYPE 2 DIABETES MELLITUS WITH DIABETIC AUTONOMIC NEUROPATHY, WITHOUT LONG-TERM CURRENT USE OF INSULIN (H): ICD-10-CM

## 2025-05-05 RX ORDER — GLIPIZIDE 10 MG/1
20 TABLET, FILM COATED, EXTENDED RELEASE ORAL DAILY
Qty: 180 TABLET | Refills: 1 | Status: SHIPPED | OUTPATIENT
Start: 2025-05-05

## 2025-05-13 SDOH — HEALTH STABILITY: PHYSICAL HEALTH: ON AVERAGE, HOW MANY DAYS PER WEEK DO YOU ENGAGE IN MODERATE TO STRENUOUS EXERCISE (LIKE A BRISK WALK)?: 5 DAYS

## 2025-05-13 SDOH — HEALTH STABILITY: PHYSICAL HEALTH: ON AVERAGE, HOW MANY MINUTES DO YOU ENGAGE IN EXERCISE AT THIS LEVEL?: 10 MIN

## 2025-05-13 ASSESSMENT — SOCIAL DETERMINANTS OF HEALTH (SDOH): HOW OFTEN DO YOU GET TOGETHER WITH FRIENDS OR RELATIVES?: ONCE A WEEK

## 2025-05-14 ENCOUNTER — OFFICE VISIT (OUTPATIENT)
Dept: FAMILY MEDICINE | Facility: CLINIC | Age: 60
End: 2025-05-14
Payer: COMMERCIAL

## 2025-05-14 VITALS
TEMPERATURE: 97.7 F | BODY MASS INDEX: 36.66 KG/M2 | HEART RATE: 82 BPM | OXYGEN SATURATION: 98 % | HEIGHT: 67 IN | RESPIRATION RATE: 16 BRPM | DIASTOLIC BLOOD PRESSURE: 70 MMHG | WEIGHT: 233.6 LBS | SYSTOLIC BLOOD PRESSURE: 118 MMHG

## 2025-05-14 DIAGNOSIS — G43.009 MIGRAINE WITHOUT AURA AND WITHOUT STATUS MIGRAINOSUS, NOT INTRACTABLE: ICD-10-CM

## 2025-05-14 DIAGNOSIS — E03.1 CONGENITAL HYPOTHYROIDISM WITHOUT GOITER: ICD-10-CM

## 2025-05-14 DIAGNOSIS — E11.43 TYPE 2 DIABETES MELLITUS WITH DIABETIC AUTONOMIC NEUROPATHY, WITHOUT LONG-TERM CURRENT USE OF INSULIN (H): Primary | ICD-10-CM

## 2025-05-14 DIAGNOSIS — B35.1 ONYCHOMYCOSIS: ICD-10-CM

## 2025-05-14 DIAGNOSIS — G47.33 OSA (OBSTRUCTIVE SLEEP APNEA): ICD-10-CM

## 2025-05-14 DIAGNOSIS — Z13.6 SCREENING FOR CARDIOVASCULAR CONDITION: ICD-10-CM

## 2025-05-14 DIAGNOSIS — Z00.00 ANNUAL PHYSICAL EXAM: ICD-10-CM

## 2025-05-14 LAB
EST. AVERAGE GLUCOSE BLD GHB EST-MCNC: 206 MG/DL
HBA1C MFR BLD: 8.8 % (ref 0–5.6)

## 2025-05-14 PROCEDURE — 82570 ASSAY OF URINE CREATININE: CPT | Performed by: PHYSICIAN ASSISTANT

## 2025-05-14 PROCEDURE — 84443 ASSAY THYROID STIM HORMONE: CPT | Performed by: PHYSICIAN ASSISTANT

## 2025-05-14 PROCEDURE — 3074F SYST BP LT 130 MM HG: CPT | Performed by: PHYSICIAN ASSISTANT

## 2025-05-14 PROCEDURE — 99207 PR FOOT EXAM NO CHARGE: CPT | Performed by: PHYSICIAN ASSISTANT

## 2025-05-14 PROCEDURE — 99214 OFFICE O/P EST MOD 30 MIN: CPT | Mod: 25 | Performed by: PHYSICIAN ASSISTANT

## 2025-05-14 PROCEDURE — 83036 HEMOGLOBIN GLYCOSYLATED A1C: CPT | Performed by: PHYSICIAN ASSISTANT

## 2025-05-14 PROCEDURE — 3078F DIAST BP <80 MM HG: CPT | Performed by: PHYSICIAN ASSISTANT

## 2025-05-14 PROCEDURE — 80048 BASIC METABOLIC PNL TOTAL CA: CPT | Performed by: PHYSICIAN ASSISTANT

## 2025-05-14 PROCEDURE — 82043 UR ALBUMIN QUANTITATIVE: CPT | Performed by: PHYSICIAN ASSISTANT

## 2025-05-14 PROCEDURE — 80061 LIPID PANEL: CPT | Performed by: PHYSICIAN ASSISTANT

## 2025-05-14 PROCEDURE — G2211 COMPLEX E/M VISIT ADD ON: HCPCS | Performed by: PHYSICIAN ASSISTANT

## 2025-05-14 PROCEDURE — 99396 PREV VISIT EST AGE 40-64: CPT | Performed by: PHYSICIAN ASSISTANT

## 2025-05-14 PROCEDURE — 36415 COLL VENOUS BLD VENIPUNCTURE: CPT | Performed by: PHYSICIAN ASSISTANT

## 2025-05-14 RX ORDER — CICLOPIROX 80 MG/ML
SOLUTION TOPICAL
Qty: 6.6 ML | Refills: 11 | Status: SHIPPED | OUTPATIENT
Start: 2025-05-14

## 2025-05-14 NOTE — PROGRESS NOTES
"Preventive Care Visit  [facility]  KERLINE Land, Family Medicine  May 14, 2025      Assessment & Plan     (E11.43) Type 2 diabetes mellitus with diabetic autonomic neuropathy, without long-term current use of insulin (H)  (primary encounter diagnosis)  Comment: Questionable control  Plan: HEMOGLOBIN A1C, Lipid panel reflex to direct         LDL Non-fasting, Albumin Random Urine         Quantitative with Creat Ratio, Basic metabolic         panel, FOOT EXAM        Labs pending    (G43.009) Migraine without aura and without status migrainosus, not intractable  Comment: Stable  Plan: Basic metabolic panel, TSH with free T4 reflex        Continue current therapy    (Z13.6) Screening for cardiovascular condition  Comment: Screen  Plan: Lab pending    (G47.33) JODY (obstructive sleep apnea)  Comment: Stable  Plan: Basic metabolic panel, TSH with free T4 reflex        Continue current therapy    (E03.1) Congenital hypothyroidism without goiter  Comment: Will check TSH  Plan: TSH with free T4 reflex        Labs today    (Z00.00) Annual physical exam  Comment: Return in 1 year  Plan: Basic metabolic panel, TSH with free T4 reflex            (B35.1) Onychomycosis  Comment: Trial of Penlac  Plan: ciclopirox (PENLAC) 8 % external solution        Follow-up as needed told him this is a slow process and is about 30% effective            BMI  Estimated body mass index is 36.59 kg/m  as calculated from the following:    Height as of this encounter: 1.702 m (5' 7\").    Weight as of this encounter: 106 kg (233 lb 9.6 oz).       Counseling  Appropriate preventive services were addressed with this patient via screening, questionnaire, or discussion as appropriate for fall prevention, nutrition, physical activity, Tobacco-use cessation, social engagement, weight loss and cognition.  Checklist reviewing preventive services available has been given to the patient.  Reviewed patient's diet, addressing " concerns and/or questions.   The patient was instructed to see the dentist every 6 months.           Yan Clarke is a 59 year old, presenting for the following:  Physical        5/14/2025     3:37 PM   Additional Questions   Roomed by BRYAN Low          59-year-old here for annual exam  He has chronic disease of type 2 diabetes and hyperlipidemia  He has had some polydipsia and polyuria not really polyphasia weight has been stable he states he needs to be more active  He did have recent ablation for SVT he has not noted any recurrent symptoms since his procedure  No chest pain or shortness of breath  He still works at [employer]  Plans to retire when he hits 61               Advance Care Planning    Discussed advance care planning with patient; informed AVS has link to [employer].        5/13/2025   General Health   How would you rate your overall physical health? Good   Feel stress (tense, anxious, or unable to sleep) Only a little   (!) STRESS CONCERN      5/13/2025   Nutrition   Three or more servings of calcium each day? (!) NO   Diet: Diabetic   How many servings of fruit and vegetables per day? (!) 0-1   How many sweetened beverages each day? 0-1         5/13/2025   Exercise   Days per week of moderate/strenous exercise 5 days   Average minutes spent exercising at this level 10 min         5/13/2025   Social Factors   Frequency of gathering with friends or relatives Once a week   Worry food won't last until get money to buy more No   Food not last or not have enough money for food? No   Do you have housing? (Housing is defined as stable permanent housing and does not include staying outside in a car, in a tent, in an abandoned building, in an overnight shelter, or couch-surfing.) Yes   Are you worried about losing your housing? No   Lack of transportation? No   Unable to get utilities (heat,electricity)? No         5/13/2025   Fall Risk   Fallen 2 or more times in the past year? No  "  Trouble with walking or balance? No          5/13/2025   Dental   Dentist two times every year? (!) NO               5/13/2025   Substance Use   Alcohol more than 3/day or more than 7/wk No   Do you use any other substances recreationally? (!) OTHER     Social History     Tobacco Use    Smoking status: Never     Passive exposure: Never    Smokeless tobacco: Never   Vaping Use    Vaping status: Never Used   Substance Use Topics    Alcohol use: Yes    Drug use: Never           5/13/2025   STI Screening   New sexual partner(s) since last STI/HIV test? No   Last PSA: No results found for: \"PSA\"  ASCVD Risk   The 10-year ASCVD risk score (Kamala IGLESIAS, et al., 2019) is: 19.3%    Values used to calculate the score:      Age: 59 years      Sex: Male      Is Non- : No      Diabetic: Yes      Tobacco smoker: No      Systolic Blood Pressure: 118 mmHg      Is BP treated: No      HDL Cholesterol: 55 mg/dL      Total Cholesterol: 312 mg/dL           Reviewed and updated as needed this visit by Provider                             Objective    Exam  /70 (BP Location: Right arm, Patient Position: Sitting, Cuff Size: Adult Large)   Pulse 82   Temp 97.7  F (36.5  C) (Tympanic)   Resp 16   Ht 1.702 m (5' 7\")   Wt 106 kg (233 lb 9.6 oz)   SpO2 98%   BMI 36.59 kg/m     Estimated body mass index is 36.59 kg/m  as calculated from the following:    Height as of this encounter: 1.702 m (5' 7\").    Weight as of this encounter: 106 kg (233 lb 9.6 oz).    Physical Exam  Vitals and nursing note reviewed.   Constitutional:       Appearance: Normal appearance.   HENT:      Head: Normocephalic and atraumatic.      Right Ear: Tympanic membrane normal.      Left Ear: Tympanic membrane normal.      Nose: Nose normal. No congestion or rhinorrhea.      Mouth/Throat:      Mouth: Mucous membranes are moist.   Eyes:      Conjunctiva/sclera: Conjunctivae normal.   Cardiovascular:      Rate and Rhythm: Normal " rate and regular rhythm.      Heart sounds: Normal heart sounds.   Pulmonary:      Effort: Pulmonary effort is normal.      Breath sounds: Normal breath sounds.   Abdominal:      General: Abdomen is flat. Bowel sounds are normal.      Palpations: There is no mass.      Tenderness: There is no abdominal tenderness.   Musculoskeletal:         General: Normal range of motion.      Cervical back: Normal range of motion and neck supple.      Right lower leg: No edema.      Left lower leg: No edema.   Lymphadenopathy:      Cervical: No cervical adenopathy.   Skin:     General: Skin is warm and dry.      Comments: Onychomycosis to the great toes bilaterally worse right than left little bit on the fifth digit on the right foot as well   Neurological:      General: No focal deficit present.      Comments: Monofilament 5/5 bilateral   Psychiatric:         Mood and Affect: Mood normal.         Behavior: Behavior normal.         Thought Content: Thought content normal.         Judgment: Judgment normal.               Signed Electronically by: KERLINE Land

## 2025-05-15 ENCOUNTER — RESULTS FOLLOW-UP (OUTPATIENT)
Dept: FAMILY MEDICINE | Facility: CLINIC | Age: 60
End: 2025-05-15

## 2025-05-15 LAB
ANION GAP SERPL CALCULATED.3IONS-SCNC: 14 MMOL/L (ref 7–15)
BUN SERPL-MCNC: 21.8 MG/DL (ref 8–23)
CALCIUM SERPL-MCNC: 9.7 MG/DL (ref 8.8–10.4)
CHLORIDE SERPL-SCNC: 105 MMOL/L (ref 98–107)
CHOLEST SERPL-MCNC: 141 MG/DL
CREAT SERPL-MCNC: 1 MG/DL (ref 0.67–1.17)
CREAT UR-MCNC: 103.2 MG/DL
EGFRCR SERPLBLD CKD-EPI 2021: 86 ML/MIN/1.73M2
FASTING STATUS PATIENT QL REPORTED: NO
FASTING STATUS PATIENT QL REPORTED: NO
GLUCOSE SERPL-MCNC: 93 MG/DL (ref 70–99)
HCO3 SERPL-SCNC: 22 MMOL/L (ref 22–29)
HDLC SERPL-MCNC: 50 MG/DL
LDLC SERPL CALC-MCNC: 69 MG/DL
MICROALBUMIN UR-MCNC: <12 MG/L
MICROALBUMIN/CREAT UR: NORMAL MG/G{CREAT}
NONHDLC SERPL-MCNC: 91 MG/DL
POTASSIUM SERPL-SCNC: 4 MMOL/L (ref 3.4–5.3)
SODIUM SERPL-SCNC: 141 MMOL/L (ref 135–145)
TRIGL SERPL-MCNC: 111 MG/DL
TSH SERPL DL<=0.005 MIU/L-ACNC: 0.74 UIU/ML (ref 0.3–4.2)

## 2025-07-17 DIAGNOSIS — E11.43 TYPE 2 DIABETES MELLITUS WITH DIABETIC AUTONOMIC NEUROPATHY, WITHOUT LONG-TERM CURRENT USE OF INSULIN (H): ICD-10-CM

## 2025-07-17 RX ORDER — METFORMIN HYDROCHLORIDE 500 MG/1
2000 TABLET, EXTENDED RELEASE ORAL
Qty: 360 TABLET | Refills: 0 | Status: SHIPPED | OUTPATIENT
Start: 2025-07-17

## 2025-08-06 DIAGNOSIS — F51.02 ADJUSTMENT INSOMNIA: ICD-10-CM

## 2025-08-06 RX ORDER — TRAZODONE HYDROCHLORIDE 50 MG/1
50 TABLET ORAL AT BEDTIME
Qty: 90 TABLET | Refills: 0 | Status: SHIPPED | OUTPATIENT
Start: 2025-08-06

## 2025-08-17 ENCOUNTER — HEALTH MAINTENANCE LETTER (OUTPATIENT)
Age: 60
End: 2025-08-17

## (undated) DEVICE — PATCH CARTO 3 EXTERNAL REFERENCE 3D MAPPING CREFP6

## (undated) DEVICE — TUBE SET SMARKABLATE IRRIGATION

## (undated) DEVICE — INTRO SHEATH 8FRX10CM PINNACLE RSS802

## (undated) DEVICE — CATH ABLATION 8FR 115CM F-J CURVE BI-DIR QDOT MICRO D139504

## (undated) DEVICE — CUSTOM PACK EP

## (undated) DEVICE — CATH, QUADRAPOLAR DEFLECTABLE EP 5MM SPACING REPROCESSED

## (undated) DEVICE — INTRO SHEATH 7FRX10CM PINNACLE RSS702

## (undated) DEVICE — CATH EP 7FR X 115CM DECANAV CA

## (undated) DEVICE — ELECTRODE DEFIB CADENCE 22550R

## (undated) RX ORDER — PROPOFOL 10 MG/ML
INJECTION, EMULSION INTRAVENOUS
Status: DISPENSED
Start: 2025-03-18

## (undated) RX ORDER — ACETAMINOPHEN 325 MG/1
TABLET ORAL
Status: DISPENSED
Start: 2025-03-18